# Patient Record
Sex: FEMALE | Race: BLACK OR AFRICAN AMERICAN | NOT HISPANIC OR LATINO | Employment: OTHER | URBAN - METROPOLITAN AREA
[De-identification: names, ages, dates, MRNs, and addresses within clinical notes are randomized per-mention and may not be internally consistent; named-entity substitution may affect disease eponyms.]

---

## 2021-06-23 ENCOUNTER — TRANSFERRED RECORDS (OUTPATIENT)
Dept: HEALTH INFORMATION MANAGEMENT | Facility: CLINIC | Age: 46
End: 2021-06-23

## 2021-07-09 ENCOUNTER — TRANSFERRED RECORDS (OUTPATIENT)
Dept: HEALTH INFORMATION MANAGEMENT | Facility: CLINIC | Age: 46
End: 2021-07-09

## 2021-07-10 ENCOUNTER — TELEPHONE (OUTPATIENT)
Dept: BEHAVIORAL HEALTH | Facility: CLINIC | Age: 46
End: 2021-07-10

## 2021-07-10 ENCOUNTER — TRANSFERRED RECORDS (OUTPATIENT)
Dept: HEALTH INFORMATION MANAGEMENT | Facility: CLINIC | Age: 46
End: 2021-07-10

## 2021-07-10 ENCOUNTER — HOSPITAL ENCOUNTER (INPATIENT)
Facility: HOSPITAL | Age: 46
LOS: 23 days | Discharge: HOME | DRG: 885 | End: 2021-08-03
Attending: PSYCHIATRY & NEUROLOGY | Admitting: PSYCHIATRY & NEUROLOGY
Payer: MEDICARE

## 2021-07-10 DIAGNOSIS — F20.0 PARANOID SCHIZOPHRENIA (H): Primary | ICD-10-CM

## 2021-07-10 LAB
ALT SERPL-CCNC: 19 U/L (ref 18–65)
AST SERPL-CCNC: 25 U/L (ref 10–30)
CREATININE (EXTERNAL): 0.78 MG/DL (ref 0.7–1.2)
GFR ESTIMATED (EXTERNAL): >60 ML/MIN/1.73M2
GLUCOSE (EXTERNAL): 142 MG/DL (ref 60–99)
POTASSIUM (EXTERNAL): 3.6 MMOL/L (ref 3.5–5.1)
TSH SERPL-ACNC: 4.15 MIU/ML (ref 0.35–4.8)
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
## 2021-07-11 PROBLEM — F29 PSYCHOSIS (H): Status: ACTIVE | Noted: 2021-07-11

## 2021-07-11 PROCEDURE — 124N000001 HC R&B MH

## 2021-07-11 PROCEDURE — 250N000013 HC RX MED GY IP 250 OP 250 PS 637: Performed by: NURSE PRACTITIONER

## 2021-07-11 PROCEDURE — 250N000011 HC RX IP 250 OP 636: Performed by: NURSE PRACTITIONER

## 2021-07-11 PROCEDURE — 99223 1ST HOSP IP/OBS HIGH 75: CPT | Mod: AI | Performed by: NURSE PRACTITIONER

## 2021-07-11 PROCEDURE — 250N000013 HC RX MED GY IP 250 OP 250 PS 637: Performed by: PSYCHIATRY & NEUROLOGY

## 2021-07-11 RX ORDER — BENZTROPINE MESYLATE 1 MG/1
1 TABLET ORAL 2 TIMES DAILY
Status: ON HOLD | COMMUNITY
End: 2021-08-02

## 2021-07-11 RX ORDER — VITAMIN B COMPLEX
50 TABLET ORAL DAILY
Status: DISCONTINUED | OUTPATIENT
Start: 2021-07-12 | End: 2021-08-03 | Stop reason: HOSPADM

## 2021-07-11 RX ORDER — BENZTROPINE MESYLATE 1 MG/1
1 TABLET ORAL 2 TIMES DAILY PRN
Status: DISCONTINUED | OUTPATIENT
Start: 2021-07-11 | End: 2021-08-03 | Stop reason: HOSPADM

## 2021-07-11 RX ORDER — OLANZAPINE 10 MG/2ML
5-10 INJECTION, POWDER, FOR SOLUTION INTRAMUSCULAR 3 TIMES DAILY PRN
Status: DISCONTINUED | OUTPATIENT
Start: 2021-07-11 | End: 2021-07-15

## 2021-07-11 RX ORDER — MULTIVITAMIN/IRON/FOLIC ACID 18MG-0.4MG
1 TABLET ORAL DAILY
COMMUNITY

## 2021-07-11 RX ORDER — MULTIPLE VITAMINS W/ MINERALS TAB 9MG-400MCG
1 TAB ORAL DAILY
Status: DISCONTINUED | OUTPATIENT
Start: 2021-07-12 | End: 2021-08-03 | Stop reason: HOSPADM

## 2021-07-11 RX ORDER — DIPHENHYDRAMINE HCL 25 MG
25-50 CAPSULE ORAL EVERY 6 HOURS PRN
Status: DISCONTINUED | OUTPATIENT
Start: 2021-07-11 | End: 2021-08-02

## 2021-07-11 RX ORDER — LORAZEPAM 1 MG/1
1-2 TABLET ORAL EVERY 6 HOURS PRN
Status: DISCONTINUED | OUTPATIENT
Start: 2021-07-11 | End: 2021-08-02

## 2021-07-11 RX ORDER — LANOLIN ALCOHOL/MO/W.PET/CERES
3 CREAM (GRAM) TOPICAL
Status: DISCONTINUED | OUTPATIENT
Start: 2021-07-11 | End: 2021-08-03 | Stop reason: HOSPADM

## 2021-07-11 RX ORDER — LORAZEPAM 2 MG/ML
1-2 INJECTION INTRAMUSCULAR EVERY 6 HOURS PRN
Status: DISCONTINUED | OUTPATIENT
Start: 2021-07-11 | End: 2021-08-02

## 2021-07-11 RX ORDER — ACETAMINOPHEN 325 MG/1
650 TABLET ORAL EVERY 4 HOURS PRN
Status: DISCONTINUED | OUTPATIENT
Start: 2021-07-11 | End: 2021-08-03 | Stop reason: HOSPADM

## 2021-07-11 RX ORDER — FLUPHENAZINE DECANOATE 25 MG/ML
12.5 INJECTION, SOLUTION INTRAMUSCULAR; SUBCUTANEOUS
Status: ON HOLD | COMMUNITY
End: 2021-07-13

## 2021-07-11 RX ORDER — MULTIPLE VITAMINS W/ MINERALS TAB 9MG-400MCG
1 TAB ORAL DAILY
Status: DISCONTINUED | OUTPATIENT
Start: 2021-07-12 | End: 2021-07-11

## 2021-07-11 RX ORDER — LEVOTHYROXINE SODIUM 50 UG/1
50 TABLET ORAL DAILY
Status: ON HOLD | COMMUNITY
End: 2021-08-02

## 2021-07-11 RX ORDER — MAGNESIUM HYDROXIDE/ALUMINUM HYDROXICE/SIMETHICONE 120; 1200; 1200 MG/30ML; MG/30ML; MG/30ML
30 SUSPENSION ORAL EVERY 4 HOURS PRN
Status: DISCONTINUED | OUTPATIENT
Start: 2021-07-11 | End: 2021-08-02

## 2021-07-11 RX ORDER — HALOPERIDOL 5 MG/1
5-10 TABLET ORAL EVERY 6 HOURS PRN
Status: DISCONTINUED | OUTPATIENT
Start: 2021-07-11 | End: 2021-07-16

## 2021-07-11 RX ORDER — LITHIUM CARBONATE 600 MG/1
600 CAPSULE ORAL AT BEDTIME
Status: ON HOLD | COMMUNITY
End: 2021-07-13

## 2021-07-11 RX ORDER — LEVOTHYROXINE SODIUM 50 UG/1
50 TABLET ORAL
Status: DISCONTINUED | OUTPATIENT
Start: 2021-07-12 | End: 2021-08-03 | Stop reason: HOSPADM

## 2021-07-11 RX ORDER — DIVALPROEX SODIUM 500 MG/1
500 TABLET, EXTENDED RELEASE ORAL AT BEDTIME
Status: DISCONTINUED | OUTPATIENT
Start: 2021-07-11 | End: 2021-07-12

## 2021-07-11 RX ORDER — MULTIVIT-MIN/IRON/FOLIC ACID/K 18-600-40
2 CAPSULE ORAL DAILY
Status: ON HOLD | COMMUNITY
End: 2021-08-02

## 2021-07-11 RX ORDER — DIVALPROEX SODIUM 250 MG/1
250 TABLET, EXTENDED RELEASE ORAL DAILY
Status: ON HOLD | COMMUNITY
End: 2021-08-02

## 2021-07-11 RX ORDER — DIVALPROEX SODIUM 500 MG/1
1500 TABLET, EXTENDED RELEASE ORAL AT BEDTIME
Status: ON HOLD | COMMUNITY
End: 2021-08-02

## 2021-07-11 RX ORDER — AMOXICILLIN 250 MG
1 CAPSULE ORAL 2 TIMES DAILY PRN
Status: DISCONTINUED | OUTPATIENT
Start: 2021-07-11 | End: 2021-08-03 | Stop reason: HOSPADM

## 2021-07-11 RX ORDER — HALOPERIDOL 5 MG/ML
5-10 INJECTION INTRAMUSCULAR EVERY 6 HOURS PRN
Status: DISCONTINUED | OUTPATIENT
Start: 2021-07-11 | End: 2021-07-16

## 2021-07-11 RX ORDER — CLONIDINE HYDROCHLORIDE 0.1 MG/1
0.1 TABLET ORAL 2 TIMES DAILY PRN
Status: DISCONTINUED | OUTPATIENT
Start: 2021-07-11 | End: 2021-08-02

## 2021-07-11 RX ORDER — LITHIUM CARBONATE 300 MG/1
300 TABLET, FILM COATED, EXTENDED RELEASE ORAL AT BEDTIME
Status: DISCONTINUED | OUTPATIENT
Start: 2021-07-11 | End: 2021-07-12

## 2021-07-11 RX ORDER — OLANZAPINE 5 MG/1
5-10 TABLET ORAL 3 TIMES DAILY PRN
Status: DISCONTINUED | OUTPATIENT
Start: 2021-07-11 | End: 2021-07-15

## 2021-07-11 RX ORDER — HYDROXYZINE HYDROCHLORIDE 25 MG/1
25-50 TABLET, FILM COATED ORAL EVERY 4 HOURS PRN
Status: DISCONTINUED | OUTPATIENT
Start: 2021-07-11 | End: 2021-08-02

## 2021-07-11 RX ORDER — DIPHENHYDRAMINE HYDROCHLORIDE 50 MG/ML
25-50 INJECTION INTRAMUSCULAR; INTRAVENOUS EVERY 6 HOURS PRN
Status: DISCONTINUED | OUTPATIENT
Start: 2021-07-11 | End: 2021-08-02

## 2021-07-11 RX ADMIN — DIVALPROEX SODIUM 500 MG: 500 TABLET, FILM COATED, EXTENDED RELEASE ORAL at 20:21

## 2021-07-11 RX ADMIN — OLANZAPINE 10 MG: 10 INJECTION, POWDER, FOR SOLUTION INTRAMUSCULAR at 09:18

## 2021-07-11 RX ADMIN — CLONIDINE HYDROCHLORIDE 0.1 MG: 0.1 TABLET ORAL at 17:46

## 2021-07-11 RX ADMIN — LORAZEPAM 2 MG: 1 TABLET ORAL at 19:02

## 2021-07-11 RX ADMIN — HALOPERIDOL 10 MG: 5 TABLET ORAL at 19:02

## 2021-07-11 RX ADMIN — LITHIUM CARBONATE 300 MG: 300 TABLET, EXTENDED RELEASE ORAL at 20:21

## 2021-07-11 RX ADMIN — LORAZEPAM 2 MG: 1 TABLET ORAL at 10:58

## 2021-07-11 RX ADMIN — ACETAMINOPHEN 650 MG: 325 TABLET, FILM COATED ORAL at 09:59

## 2021-07-11 RX ADMIN — HALOPERIDOL 10 MG: 5 TABLET ORAL at 10:58

## 2021-07-11 RX ADMIN — MELATONIN 3 MG: 3 TAB ORAL at 23:59

## 2021-07-11 RX ADMIN — DIPHENHYDRAMINE HYDROCHLORIDE 50 MG: 25 CAPSULE ORAL at 10:59

## 2021-07-11 RX ADMIN — DIPHENHYDRAMINE HYDROCHLORIDE 50 MG: 25 CAPSULE ORAL at 19:02

## 2021-07-11 ASSESSMENT — ACTIVITIES OF DAILY LIVING (ADL)
ORAL_HYGIENE: PROMPTS
DRESS: PROMPTS
HYGIENE/GROOMING: PROMPTS

## 2021-07-11 NOTE — PLAN OF CARE
Problem: Behavioral Health Plan of Care  Goal: Patient-Specific Goal (Individualization)  Description: Pt will sleep 6-8 hours per night.   Pt will eat 50% of meals.   Pt will attend 50% of groups when on open unit.  Pt will comply with nursing assessment as needed.   Pt will accept PRN medications when offered.    7/11 - Pt unable to wean from MHICU at this time, to be reassessed daily.  7/11/2021 1258 by Arabella Lauren RN  Outcome: No Change    Pt on open unit at start of shift, 1:1 staff present for safety while wearing extensive hair decor that pt refused to remove. Pt made multiple requests for items that are restricted on unit, wanted jacket and sweat pants due to being cold, given extra sweatshirt. Pt ate breakfast in lounge with peers, went to  with staff present and was noted to have dipped her long extension of decorated hair into the toilet, taking off one of the wet hair scrunchies to hand to the 1:1 staff. Pt's hair was dripping onto her clothing as she continued to lounge to share more of the scrunchies with her peers. When redirected to her room, pt became agitated and screamed at staff, repeatedly cursing and posturing within inches of staff's face. Pt threatened to kill staff and was unable to be calmed. Control team called at 0844 to pt's room. Nursing staff was instructed to remove hair decor as pt needed to be placed in MHICU. Manual hold of limbs was done as writer removed over 50 scrunchies and metal hairpins from hair. Pt calmer, but continued to intermittently pray and curse at staff. Hair was wanded to assure all contraband was removed. Pt was given IM Zyprexa 10mg and agreed to walk on her own to MHICU at 0913. Requested Tylenol and received at 0959, although would not report further about her pain. Pt is disorganized at all times, suspicious and refusing to cooperate or talk to particular staff. Pt paranoid that her food was touched by evil hands and refused to eat lunch. Pt appears to  be having AH and frequently is noted talking over her right shoulder. Oral Haldol, Benadryl and Ativan given at 1058 for continued agitation and paranoia. Pt remained awake throughout the rest of shift, has odd content in conversation and makes frequent requests. Continued monitoring for safety and further needs.  Pt's mother called earlier today, was notified by pt's boyfriend that she was inpt. Pt refused to sign CORTNEY for,  or to speak with her mother. Mother made aware and did share information one-way with staff RN to discuss recent contacts she has had with pt, but unable to have conversation. Mother (Jeanna Hines) is in [place] and left phone # [phone].    Problem: Thought Process Alteration  Goal: Optimal Thought Clarity  Description: Pt will have a linear reality based conversation by discharge.   Outcome: No Change    1530 - Face to face end of shift report to be communicated to oncoming shift RN.     Arabella Lauren RN  7/11/2021  2:24 PM

## 2021-07-11 NOTE — PLAN OF CARE
"  Problem: Behavioral Health Plan of Care  Goal: Patient-Specific Goal (Individualization)  Description: Pt will sleep 6-8 hours per night.   Pt will eat 50% of meals.   Pt will attend 50% of groups when on open unit.  Pt will comply with nursing assessment as needed.   Pt will accept PRN medications when offered.    7/11 - Pt unable to wean from MHICU at this time, to be reassessed daily.  Outcome: Improving  Note:   Pt has been in the MHICU this shift. Pt knocked on the nursing station window every few minutes through out the shift. Pt asked for new sweatshirts stating she needs a blue one, pt has water on her sweatshirt when she comes to the nursing stating and nursing asked her not to continue splashing water on her sweatshirt. Pt stripped the sheets and pillow cases off of her bed then asked for new ones. Pt states that she needed her bathroom and floor mopped because she had to go to the bathroom and could not make it to the toilet. Room was mopped and bedding was replaced. Pt refused to eat her dinner after asking if nursing could let her leave and being reminded that she is on a hold. Pt later agreed to eat a pizza if ordered. Pt made several delusional statements during shift but statements were random such as walking up to floor staff, touching her hand stating \"you are now cured of HIV\". Pt vitals taken at dinner time, /88, pulse 106. Pt given clonidine 0.1mg at 1746.     1902-pt unable to sit in one position for more than a few minutes, continues to pound on window to nursing station requesting various items such as water, coffee, new scrubs, bedding etc. Pt required frequent redirection to not take off new bedding. Pt came to the nursing station coughing loudly and stating she needs tylenol because of her cold.     Pt given benadryl 50mg, ativan 2mg and haldol 10mg at 1902. Pt cooperative with prn and scheduled medicaions. Pt able to relax and lay on her bed around 2100 and appears to be sleeping. "      Problem: Thought Process Alteration  Goal: Optimal Thought Clarity  Description: Pt will have a linear reality based conversation by discharge.   Outcome: Improving     Face to face end of shift report communicated to night shift RN.     Nenita Harding RN  7/11/2021  4:33 PM

## 2021-07-11 NOTE — PLAN OF CARE
"Violent/Self-Destructive Seclusion or Restraint Initiation Note    Patient behaviors justifying violent/self-destructive seclusion or restraint (describe attempted least restricted alternatives and patient's response to interventions): Patient yelling and screaming at staff. Yelled out \"I'm going to kill you, you bitch!\" She also was dunking her hair in the toilet. Patient had multiple hair ties and yamilet pins in place. After dunking her hair in the toile, she said she was going to give them out to the patients.   Type of restraint initiated: manual hold    Date Started: 7/11/2021  Time Started: 08:57  LIP notified (name): Shakira HOLM NP  Order obtained: Yes.   Patient educated on reason for seclusion or restraints and discontinuation criteria: Yes  Care plan updated: Yes.     "

## 2021-07-11 NOTE — PRE-PROCEDURE
Seclusion/Restraint Face to Face Note  In person face to face assessment completed, including an evaluation of the patient's immediate reaction to the intervention, complete review of systems assessment, behavioral assessment and review/assessment of history, drugs and medications, recent labs, etc., and behavioral condition.  The patient experienced: No adverse physical outcome from seclusion/restraint initiation.  The intervention of restraint or seclusion needs to terminate.  If face to face was completed by a trained RN, the above findings were discused with Shakira Kilpatrick NP.   Luiza May RN  7/11/2021  10:41 AM

## 2021-07-11 NOTE — PLAN OF CARE
Violent/Self-Destructive Seclusion or Restraint Discontinuation Note    Type of seclusion or restraint: manual hold  Patient's response to intervention: Patient is calm and coooerateing  Date of discontinuation: 7/11/2021  Time of discontinuation: 09:12         Face to face assessment completed: yes  Restraint monitoring minimum of every 15 minutes: yes  Debriefing with patient completed: yes  Care plan updated:yes
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
## 2021-07-11 NOTE — PLAN OF CARE
"  Problem: Behavioral Health Plan of Care  Goal: Patient-Specific Goal (Individualization)  Description: Pt will sleep 6-8 hours per night.   Pt will eat 50% of meals.   Pt will attend 50% of groups.  Outcome: Improving  Note:   Pt admitted to unit at 1154, pt was seen to have long multiple scarves and hair ties around her head and hanging down past her waist. Pt informed that this would be against our policy since her scarves and ties can be used as ligatures. Pt refused to take off the scarves and ties stating no matter if you stab me, kill me, tie me up, I will not take my hair off. Pt told that the material needed to come off not her hair, pt states \"this is my hair\". Pt stated that this is cultural and we have no right to make her take them out. Supervisor notified. Pt put on one to one for safety.     Pt slept for a couple of hours then came out to the lounge and colored. Pt told nursing she is having ticks and turns her head to the left repeatedly.          Problem: Thought Process Alteration  Goal: Optimal Thought Clarity  Description: Pt will have a linear reality based conversation by discharge.   Outcome: Improving         ADMISSION NOTE    Reason for admission [place].  Safety concerns Pt wearing multiple scarves and hair ties in her hair, refusing to take them off, .  Risk for or history of violence no.   Full skin assessment: skin assessment complete, no issues.     Patient arrived on unit from [facility] accompanied by Security on 7/11/2021  11:54 AM.   Status on arrival: Pt ambulatory and cooperative until asked to take out bandanas, hair ties and scarves,.   There were no vitals taken for this visit.  Patient given tour of [facility] to  unit papers given to patient, wanding completed, belongings inventoried, and admission assessment completed.   Patient's legal status on arrival is 72 hour hold. Appropriate legal rights discussed with and copy given to patient. Patient Bill of Rights " discussed with and copy given to patient.   Patient denies SI, HI, and thoughts of self harm and contracts for safety while on unit.      Nenita Harding RN  7/11/2021  11:54 AM

## 2021-07-11 NOTE — PROGRESS NOTES
07/11/21 0045   Patient Belongings   Did you bring any home meds/supplements to the hospital?  Yes   Disposition of meds  Other (see comment)  (Given to PT RN)   Patient Belongings remains with patient;locker   Patient Belongings Remaining with Patient other (see comments)  (Traditional Head dress)   Patient Belongings Put in Hospital Secure Location (Security or Locker, etc.) other (see comments)  (Back pack, bagsx2, assorted documents, pursex2, makeup, pens, bible, masks, hat, pants, shoes, black jacket, tshirt, Bra, $4.14 in change)   Belongings Search Yes   Clothing Search Yes   Second Staff Luiza RN   List items sent to safe: [place] x2, DTA pass x3, [facility] card, $19 (9x $1 bills, 2x $5 bills)  Addendum:pink head band, String  necklace  All other belongings put in assigned cubby in belongings room.   I have reviewed my belongings list on admission and verify that it is correct.     ADDENDUM: silver colored ring    Patient signature_______________________________    Second staff witness (if patient unable to sign) ______________________________       I have received all my belongings at discharge.    Patient signature________________________________    LORIE Browne  7/11/2021  12:48 AM

## 2021-07-12 PROCEDURE — 124N000001 HC R&B MH

## 2021-07-12 PROCEDURE — 99221 1ST HOSP IP/OBS SF/LOW 40: CPT | Performed by: NURSE PRACTITIONER

## 2021-07-12 PROCEDURE — 250N000013 HC RX MED GY IP 250 OP 250 PS 637: Performed by: NURSE PRACTITIONER

## 2021-07-12 PROCEDURE — 99233 SBSQ HOSP IP/OBS HIGH 50: CPT | Performed by: NURSE PRACTITIONER

## 2021-07-12 RX ORDER — FLUPHENAZINE HYDROCHLORIDE 1 MG/1
0.5 TABLET, FILM COATED ORAL 2 TIMES DAILY
Status: DISCONTINUED | OUTPATIENT
Start: 2021-07-12 | End: 2021-07-14

## 2021-07-12 RX ORDER — LITHIUM CARBONATE 450 MG
450 TABLET, EXTENDED RELEASE ORAL AT BEDTIME
Status: DISCONTINUED | OUTPATIENT
Start: 2021-07-12 | End: 2021-07-16

## 2021-07-12 RX ADMIN — HALOPERIDOL 10 MG: 5 TABLET ORAL at 01:46

## 2021-07-12 RX ADMIN — LORAZEPAM 2 MG: 1 TABLET ORAL at 01:46

## 2021-07-12 RX ADMIN — DIPHENHYDRAMINE HYDROCHLORIDE 50 MG: 25 CAPSULE ORAL at 16:06

## 2021-07-12 RX ADMIN — DIVALPROEX SODIUM 750 MG: 500 TABLET, FILM COATED, EXTENDED RELEASE ORAL at 20:03

## 2021-07-12 RX ADMIN — MULTIPLE VITAMINS W/ MINERALS TAB 1 TABLET: TAB at 16:06

## 2021-07-12 RX ADMIN — LEVOTHYROXINE SODIUM 50 MCG: 0.05 TABLET ORAL at 06:43

## 2021-07-12 RX ADMIN — HALOPERIDOL 10 MG: 5 TABLET ORAL at 08:32

## 2021-07-12 RX ADMIN — DIPHENHYDRAMINE HYDROCHLORIDE 50 MG: 25 CAPSULE ORAL at 01:46

## 2021-07-12 RX ADMIN — LORAZEPAM 2 MG: 1 TABLET ORAL at 16:06

## 2021-07-12 RX ADMIN — LITHIUM CARBONATE 450 MG: 450 TABLET ORAL at 20:03

## 2021-07-12 RX ADMIN — OLANZAPINE 10 MG: 5 TABLET, FILM COATED ORAL at 20:04

## 2021-07-12 RX ADMIN — Medication 50 MCG: at 08:32

## 2021-07-12 RX ADMIN — DIPHENHYDRAMINE HYDROCHLORIDE 50 MG: 25 CAPSULE ORAL at 08:32

## 2021-07-12 RX ADMIN — FLUPHENAZINE HYDROCHLORIDE 0.5 MG: 1 TABLET, FILM COATED ORAL at 20:16

## 2021-07-12 RX ADMIN — LORAZEPAM 2 MG: 1 TABLET ORAL at 08:32

## 2021-07-12 RX ADMIN — HALOPERIDOL 10 MG: 5 TABLET ORAL at 16:06

## 2021-07-12 ASSESSMENT — ACTIVITIES OF DAILY LIVING (ADL)
DRESS: PROMPTS
DRESS: PROMPTS
HYGIENE/GROOMING: PROMPTS
ORAL_HYGIENE: PROMPTS
HYGIENE/GROOMING: PROMPTS
LAUNDRY: UNABLE TO COMPLETE
ORAL_HYGIENE: PROMPTS

## 2021-07-12 NOTE — PLAN OF CARE
Problem: Behavioral Health Plan of Care  Goal: Patient-Specific Goal (Individualization)  Description: Pt will sleep 6-8 hours per night.   Pt will eat 50% of meals.   Pt will attend 50% of groups when on open unit.  Pt will comply with nursing assessment as needed.   Pt will accept PRN medications when offered.    7/11 - Pt unable to wean from MHICU at this time, to be reassessed daily.  Outcome: No Change  Note:        Problem: Thought Process Alteration  Goal: Optimal Thought Clarity  Description: Pt will have a linear reality based conversation by discharge.   Outcome: No Change     Problem: Suicidal Behavior  Goal: Suicidal Behavior is Absent or Managed  Outcome: No Change   Pt. Slept 2.5 hours last night, eating at least 50% of meals, unable to participate in assessments, pt. Is irritable and thought process is not intact, is accepting prn medications when needed, unable to wean at this time due to unpredictable and irritable behaviors, knocking on door to nurses station every 5 minutes, spilling liquids on scrubs frequently, needing several new scrubs, encouraged pt. To try not to spill her liquids.  1606-  Pt. Irritable and angry, medicated with haldol 10 mg po, ativan 2 mg po and benadryl 50 mg po for increased agitation.  2004-  Pt. Talking loudly, knocking on doors frequently, increased agitation noted, medicated with zyprexa 10 mg po for increased agitation.    Face to face end of shift report will be communicated to oncoming night shift RN.     Eri Clifford RN  7/12/2021  9:26 PM

## 2021-07-12 NOTE — PLAN OF CARE
Problem: Thought Process Alteration  Goal: Optimal Thought Clarity  Description: Pt will have a linear reality based conversation by discharge.   Outcome: No Change   Patient is unable to have a linear reality based conversation with staff.  She made multiple listed of shoes and Faro[place] letters and asked staff to look them up on the internet.        Problem: Behavioral Health Plan of Care  Goal: Patient-Specific Goal (Individualization)  Description: Pt will sleep 6-8 hours per night.   Pt will eat 50% of meals.   Pt will attend 50% of groups when on open unit.  Pt will comply with nursing assessment as needed.   Pt will accept PRN medications when offered.    7/11 - Pt unable to wean from MHICU at this time, to be reassessed daily.  Outcome: No Change  Note:      Patient has been up most of the shift.  She comes to the nurses station window and knocks every couple minutes.  She requested multiple snacks and coffee.  Gave patient 3 mg Melatonin at 2339 with no effect. She is redirectable and calm when talking with staff.  She was restless and pacing in the lounge and agreed to take 10 mg Haldol, 2 mg Ativan, and 50 mg Benadryl at 0146.  She was in bed asleep at 0245 but awake again at 0430. Patient was incontinent of urine while asleep.  She was able to attend to her hygiene needs independently.  No complaints of pain.  Face to face end of shift report communicated to day shift RN.     Brook Montero RN  7/12/2021  6:07 AM
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
## 2021-07-12 NOTE — PLAN OF CARE
"Pt's significant other \"Juan\" called the  unit asking to speak with pt. He stated that today is there 22 month anniversary and they celebrate their anniversay every month. He stated he was pt's . Took a message at this time.   "
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
## 2021-07-12 NOTE — PLAN OF CARE
"    SW and Provider met with patient. Patient stated she does not have a . When provider asked patient , \"Who is Juan?\"  Patient answered, \" Juan is an ex roommate.\" Provider asked, \"Why would Juan be stating he is your ? \" Patient said, \" That's weird, I do not know why he would say that.\" Patient shared that she has a boy friend Edison also on the unit and she moved to [facility] with him. She also stated she would discharge home to Edison's family when she can leave. Provider explained to the patient that Edison on the unit stated he does not know her. Patient said, \" Do not insult my intelligence. \"    SW asked, \" Do you have a ?\" Patient answered, \" My old  through [facility] was Eh Shepard. \"     SW asked patient, \" Do you have a Medication provider? \" Patient answered, \" I was seeing Dr. Weiner from [facility].\"     SW asked the patient, \" Do you have an Individual Therapist?\" The patient said, \" I was seeing a Cecilio Mcdonnell at [facility].\"  "

## 2021-07-12 NOTE — PLAN OF CARE
"SHIFT SUMMARY:      Denies suicidal or homicidal thoughts, depression and anxiety or pain. Mood has been calm and cooperative. Perseverating on cleanliness of room and self. Rather than explaining that there was pudding on the floor, she described it as \"spirits covering the floor which need to be mopped to cleanse the floor.\" Focused on room cleanliness: \"I need a new roll of toilet paper, because this one is contaminated.\" and \"the sink needs to be sanitized\"    Her father called and she refused to call him back;  Also refused a release of information for him, her mom and brother.     Drawing \"tattoos\" on herself with markers. Has changed her sweatshirt from green to blue and requests another green sweatshirt \"because green is life and it breathes life into her tattoos\" also states she \"won't not shower as long as I am here because it will wash-off the tattoos\".      PRNs:  At 0832, benadryl 50 mg PO, haldol 10 mg PO and ativan 2 mg PO given for agitation;  Effective. Calmly sitting on bed, eating.      End of shift report given to oncoming nurse.            Problem: Behavioral Health Plan of Care  Goal: Patient-Specific Goal (Individualization)  Description: Pt will sleep 6-8 hours per night.   Pt will eat 50% of meals.   Pt will attend 50% of groups when on open unit.  Pt will comply with nursing assessment as needed.   Pt will accept PRN medications when offered.    7/11 - Pt unable to wean from MHICU at this time, to be reassessed daily.  Outcome: Improving  Goal: Absence of New-Onset Illness or Injury  Outcome: Improving     Problem: Thought Process Alteration  Goal: Optimal Thought Clarity  Description: Pt will have a linear reality based conversation by discharge.   Outcome: Improving     Problem: Suicidal Behavior  Goal: Suicidal Behavior is Absent or Managed  Outcome: Improving     "
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
## 2021-07-13 PROCEDURE — 99233 SBSQ HOSP IP/OBS HIGH 50: CPT | Performed by: NURSE PRACTITIONER

## 2021-07-13 PROCEDURE — 250N000013 HC RX MED GY IP 250 OP 250 PS 637: Performed by: NURSE PRACTITIONER

## 2021-07-13 PROCEDURE — 250N000011 HC RX IP 250 OP 636: Performed by: NURSE PRACTITIONER

## 2021-07-13 PROCEDURE — 124N000001 HC R&B MH

## 2021-07-13 RX ORDER — LITHIUM CARBONATE 300 MG/1
600 TABLET, FILM COATED, EXTENDED RELEASE ORAL AT BEDTIME
Status: ON HOLD | COMMUNITY
End: 2021-08-02

## 2021-07-13 RX ORDER — FLUPHENAZINE HYDROCHLORIDE 1 MG/1
0.5 TABLET ORAL 2 TIMES DAILY
Status: ON HOLD | COMMUNITY
End: 2021-08-02

## 2021-07-13 RX ORDER — DIVALPROEX SODIUM 500 MG/1
1000 TABLET, EXTENDED RELEASE ORAL AT BEDTIME
Status: DISCONTINUED | OUTPATIENT
Start: 2021-07-13 | End: 2021-07-14

## 2021-07-13 RX ADMIN — ALUMINUM HYDROXIDE, MAGNESIUM HYDROXIDE, AND SIMETHICONE 30 ML: 200; 200; 20 SUSPENSION ORAL at 18:00

## 2021-07-13 RX ADMIN — DIPHENHYDRAMINE HYDROCHLORIDE 50 MG: 50 INJECTION INTRAMUSCULAR; INTRAVENOUS at 00:13

## 2021-07-13 RX ADMIN — FLUPHENAZINE HYDROCHLORIDE 0.5 MG: 1 TABLET, FILM COATED ORAL at 08:40

## 2021-07-13 RX ADMIN — HALOPERIDOL LACTATE 10 MG: 5 INJECTION, SOLUTION INTRAMUSCULAR at 00:13

## 2021-07-13 RX ADMIN — LORAZEPAM 2 MG: 1 TABLET ORAL at 18:26

## 2021-07-13 RX ADMIN — DIPHENHYDRAMINE HYDROCHLORIDE 50 MG: 25 CAPSULE ORAL at 18:26

## 2021-07-13 RX ADMIN — HALOPERIDOL 10 MG: 5 TABLET ORAL at 18:26

## 2021-07-13 RX ADMIN — DIVALPROEX SODIUM 1000 MG: 500 TABLET, FILM COATED, EXTENDED RELEASE ORAL at 20:08

## 2021-07-13 RX ADMIN — HALOPERIDOL 10 MG: 5 TABLET ORAL at 06:54

## 2021-07-13 RX ADMIN — MULTIPLE VITAMINS W/ MINERALS TAB 1 TABLET: TAB at 16:38

## 2021-07-13 RX ADMIN — LEVOTHYROXINE SODIUM 50 MCG: 0.05 TABLET ORAL at 06:09

## 2021-07-13 RX ADMIN — HYDROXYZINE HYDROCHLORIDE 50 MG: 25 TABLET, FILM COATED ORAL at 08:40

## 2021-07-13 RX ADMIN — Medication 50 MCG: at 08:40

## 2021-07-13 RX ADMIN — FLUPHENAZINE HYDROCHLORIDE 0.5 MG: 1 TABLET, FILM COATED ORAL at 20:08

## 2021-07-13 RX ADMIN — LITHIUM CARBONATE 450 MG: 450 TABLET ORAL at 20:08

## 2021-07-13 RX ADMIN — OLANZAPINE 10 MG: 5 TABLET, FILM COATED ORAL at 16:57

## 2021-07-13 RX ADMIN — LORAZEPAM 2 MG: 1 TABLET ORAL at 06:54

## 2021-07-13 RX ADMIN — LORAZEPAM 2 MG: 2 INJECTION INTRAMUSCULAR; INTRAVENOUS at 00:13

## 2021-07-13 RX ADMIN — DIPHENHYDRAMINE HYDROCHLORIDE 50 MG: 25 CAPSULE ORAL at 06:54

## 2021-07-13 ASSESSMENT — ACTIVITIES OF DAILY LIVING (ADL)
ORAL_HYGIENE: PROMPTS
LAUNDRY: UNABLE TO COMPLETE
HYGIENE/GROOMING: INDEPENDENT
DRESS: PROMPTS

## 2021-07-13 NOTE — PLAN OF CARE
Face to face shift report received from Eri GREEN RN. Rounding completed, pt observed in MHICU awake and alert.  In no apparent distress. Respirations are even and non labored.        Problem: Behavioral Health Plan of Care  Goal: Patient-Specific Goal (Individualization)  Description: Pt will sleep 6-8 hours per night.   Pt will eat 50% of meals.   Pt will attend 50% of groups when on open unit.  Pt will comply with nursing assessment as needed.   Pt will accept PRN medications when offered.    7/11 - Pt unable to wean from MHICU at this time, to be reassessed daily.  Outcome: No Change      0000-Pt was banging on the door multiple times, she was noted to have taken a cup of water on dumped it on her bed and then told staff she was incontinent of urine.  She became increasingly agitated, yelling, screaming and talking non sensicle in nature.      Pt was given prn medication at approx. 0010 (Haldol, Benadryl, and Ativan IM per MD order.)     She did sleep approx. 2.5 hours during the night.     0630-Pt began to become increasingly anxious, pounding on the doors and windows of MHICU.  Wiping her buttox with her sweatshirt, plugged the toilet, yelling.  Pt received PRN medications Haldol, Benadryl, and Ativan.  See MAR.      Face to face report will be communicated to oncoming ANTONIO.    Danisha Torres RN  7/13/2021  7:03 AM    Face to face report will be communicated to oncoming RN.

## 2021-07-13 NOTE — PLAN OF CARE
MÓNICA faxed Petition for Committment to [facility].     MÓNICA verified with Daja GRACE with [facility]. She confirmed that she received the petition for commitment.

## 2021-07-13 NOTE — PLAN OF CARE
"    1:1 time with the patient.  No changes made to the discharge plan at this time.   See the AVS for follow up appointments and recommendations.     SW spoke to patient. Patient discussed her self drawn marker tattoos.     MÓNICA received an email from Daja with [facility] that stated:     \"Shoaib Paris is going to do the screening.    Thanks\"    Shoaib Paris called to screen patient.     Shoaib Paris called and spoke to SW and requested SW to try to have patient sign in voluntary if she refuses he will submit the pettition to the .     MÓNICA spoke to patient. Sw asked the patient, \" Are you willing to sign in voluntarily to the hospital?\" Patient said, \" I just want to go home, and talk to Father Michelet.\"     MÓNICA called and spoke to Shoaib Paris and updated him that the patient refused to sign in voluntary.     Shoaib Paris is submitting the Petition to the .   "

## 2021-07-13 NOTE — PLAN OF CARE
Problem: Behavioral Health Plan of Care  Goal: Patient-Specific Goal (Individualization)  Description: Pt will sleep 6-8 hours per night.   Pt will eat 50% of meals.   Pt will attend 50% of groups when on open unit.  Pt will comply with nursing assessment as needed.   Pt will accept PRN medications when offered.    7/11 - Pt able to wean from MHICU on a limited basis. This will be reassessed daily. 7/13/21: will try coming to the open unit for breakfast and group today.  Outcome: Improving  Note: Denies suicidal or homicidal ideation, anxiety or depression and pain.    Repeatedly knocking on the window in to the nurse's station and demanding changes of clothes.     Approached the nurse's station with her glasses and requested her reading glasses. Writer was unable to promptly locate them which agitated her. Offered a PRN 10 mg PO zyprexa at 1700, which she did take;  Minimally effective - singing loudly at nurse's station, dancing and attempting to touch others;  PRN benadryl 50 mg PO, haldol 10 mg, and ativan 2 mg PO given and it was effective - sleeping in her MHICU room.    When given HS meds, she took the offered water cup to her sink, poured out the water in the cup, scrubbed the cup with her fingers, then refilled with water from her sink. Took her HS meds, which included her depakote, without issue.    Weaned approximately two hours.    Report given to oncoming nurse.     Problem: Thought Process Alteration  Goal: Optimal Thought Clarity  Description: Pt will have a linear reality based conversation by discharge.   Outcome: Improving     Problem: Suicidal Behavior  Goal: Suicidal Behavior is Absent or Managed  Outcome: Improving

## 2021-07-13 NOTE — PLAN OF CARE
"Problem: Behavioral Health Plan of Care  Goal: Patient-Specific Goal (Individualization)  Description: Pt will sleep 6-8 hours per night.   Pt will eat 50% of meals.   Pt will attend 50% of groups when on open unit.  Pt will comply with nursing assessment as needed.   Pt will accept PRN medications when offered.  7/11 - Pt able to wean from MHICU on a limited basis. This will be reassessed daily. 7/13/21: will try coming to the open unit for breakfast and group today.  Outcome: Improving  She is up on the unit. She is maintaining appropriate boundaries with staff and peers. She can be intrusive but is redirectable. She is \"thankful\" about being able to go to groups today. She showered after breakfast. She was screened over the phone due to a petition for commitment being submitted to the [facility].   1500: she spent most of the day on the open unit. She attended some groups.     Problem: Thought Process Alteration  Goal: Optimal Thought Clarity  Description: Pt will have a linear reality based conversation by discharge.   Outcome: Improving  She is preoccupied. She is disorganized. She is noted to respond to auditory hallucinations. She makes fair eye contact. She stops mid conversation, looks away as if listening to someone else, then states \"ok\" and returns to the conversation with this nurse.      "

## 2021-07-13 NOTE — PLAN OF CARE
Prior to Admission Medication Reconciliation:     Medications added:   [x] None  [] As listed below:    Medications deleted:   [x] None  [] As listed below:    Medications marked for review/removal by attending:  [x] None  [] As listed below:    Changes made to existing medications:   [] None  [x] As listed below:    Lithium from IR to CR per pharmacy and prescribing facility report    Prolixin from 1/2 tab daily to BID per pharmacy and prescribing facility report    Last times/dates taken verified with patient:  [] Yes- completed myself  [] Prepared PTA medlist for review only. (will not be available to review personally)  [x] Did not review with patient. Rx verification only. Review completed by nursing.    [] Nurse completed no changes made (double checked entries)  [] Unable to review with patient at this time:  [] Nurse completed/changes made:     Allergies listed at another location:  []Not applicable   []See below:    Allergy review:    [x]Did not review: reviewed by nursing  []Did not review: pt unable at this time  []Patient/MAR verified NKDA  []Patient/MAR verified current existing allergies: no changes made    Medication reconciliation sources:   []Patient  []Patient family member/emergency contact: **  [x][facility] Report Review:    Home Medications     - Last Reconciled 06/23/21 by Michael Mendoza, Med Assist    benztropine 1 mg tablet 1 mg PO BID   cholecalciferol (vitamin D3) 2,000 unit capsule 2,000 units PO DAILY   divalproex 250 mg tablet,extended release 24 hr 250 mg PO   divalproex 500 mg tablet,extended release 24 hr 1,500 mg PO BEDTIME   fluphenazine decanoate 25 mg/mL injection solution 12.5 mg IM Q2W   levothyroxine 50 mcg tablet 50 mcg PO DAILY   lithium carbonate 300 mg capsule 600 mg PO BEDTIME   multivitamin-ferrous fumarate-folic acid 18 mg-400 mcg tablet (Cerovite Advanced Formula) 1 tab PO DAILY     []Epic Chart Review  []Care Everywhere review  []Pharmacy med list: **  [x]Pharmacy phone  call: all meds due to be mailed last Friday, consistently mailed out every 2 weeks, per pharmacy, fills reflect compliancy  []Outside meds dispense report  []Nursing home or Assisted Living MAR:  [x]Other: [facility]: last seen 6/25/21    Benztropine 1 mg BID    certavite daily    Divalproex  mg 1 tab daily     Divalproex  mg 3 tabs at bedtime     Levothyroxine 50 mcg daily     Lithium carbonate  mg - 600 mg at bedtime     Vitamin D 3 50 mcg 4000 units daily     Fluphenazine 1 mg 1/2 tablet BID   [facility] [place]     [facility] desired at discharge[facility]    Is patient on coumadin?  [x]No    Requests for consultation by provider or pharmacist:   [] Patient understands why all of their meds were prescribed and how to take them. No questions.   [] Managing party has no questions.   [] Patient/ managing party has questions about the following:  [x] Did not review with patient. Cannot assess.     Fill dates and reported compliancy:  [x] Fill dates coincide with compliancy for all/most maintenance meds.   [] Fill dates do not coincide with compliancy with maintenance meds. See notes in PTA medlist and in comments.    [] Fill dates do not coincide with the following medications but pt reports compliancy:  [] Did not review with patient. Cannot assess.     Historian accuracy:  [] Excellent- alert and oriented, understands why meds were prescribed and how to take, able to answer specifics  [] Good- alert and oriented, understands why meds were prescribed and how to take, some confusion   [] Fair- alert and oriented, doesn't know medications without list, cannot answer specifics about medications, but has a decent process for which to take at home  [] Poor- does not know medications, may not have a process to take at home, may be cognitively unable to review at this time  []Medication management done by family member or facility, no concerns about historian accuracy.   [x] Did not review  with patient. Cannot assess.     Medication Management:  [] Manages meds independently  [] Family member/ other party manages meds:  [] Meds managed by staff at facility  [] Meds set up by home care, family/other party helps administer  [] Meds set up by home care, self administers  [x] Did not review with patient. Cannot assess.     Other medications aside from PTA:  [] Denies taking any medications aside from those listed in PTA meds  [] Reports taking another medication(s) but cannot recall the name(s)  [] Refuses to say.  [x] Did not review with patient. Cannot assess.     Comments: fill dates reflect compliancy at pharmacy. Pt gets mail out every two weeks. Meds due to be mailed out Friday but they were unable to reach patient. No concerns. Will not be speaking to patient unless otherwise requested.     Lolita Villalobos on 7/13/2021 at 12:13 PM       Discrepancies: [x] No []Not Applicable []Yes: listed below    Time spent on medication reconciliation:   []5-20 mins  [x]20-40 mins  []> 40 mins    Issues completing PTA medication reconciliation:  [] On hold for a long time  [] Waited for a call back  [] Fax didn't come through  [] Fax took a long time  [] Other:    Notifying appropriate party of changes/additions/discrepancies:  [x]No pertinent changes made, notification not necessary.   [] Notified attending provider via text page/phone call  [] Notified attending provider in person  [] Notified pharmacy  [] Notified nurse  [] Attending provider not available, left detailed notes  [] Pt is not admitted to floor yet, PTA meds completed before admission.   Medications Prior to Admission   Medication Sig Dispense Refill Last Dose     lithium ER (LITHOBID) 300 MG CR tablet Take 600 mg by mouth At Bedtime        benztropine (COGENTIN) 1 MG tablet Take 1 mg by mouth 2 times daily   Unknown at Unknown time     divalproex sodium extended-release (DEPAKOTE ER) 250 MG 24 hr tablet Take 250 mg by mouth daily   Unknown at  Unknown time     divalproex sodium extended-release (DEPAKOTE ER) 500 MG 24 hr tablet Take 1,500 mg by mouth At Bedtime   Unknown at Unknown time     fluPHENAZine (PROLIXIN) 1 MG tablet Take 0.5 mg by mouth 2 times daily    Unknown at Unknown time     levothyroxine (SYNTHROID/LEVOTHROID) 50 MCG tablet Take 50 mcg by mouth daily   Unknown at Unknown time     Multiple Vitamins-Minerals (CENTROVITE) TABS Take 1 tablet by mouth daily   Unknown at Unknown time     Vitamin D (Cholecalciferol) 50 MCG (2000 UT) CAPS Take 2 tablets by mouth daily    Unknown at Unknown time

## 2021-07-14 PROCEDURE — 124N000001 HC R&B MH

## 2021-07-14 PROCEDURE — 99233 SBSQ HOSP IP/OBS HIGH 50: CPT | Performed by: NURSE PRACTITIONER

## 2021-07-14 PROCEDURE — 250N000013 HC RX MED GY IP 250 OP 250 PS 637: Performed by: PSYCHIATRY & NEUROLOGY

## 2021-07-14 PROCEDURE — 250N000013 HC RX MED GY IP 250 OP 250 PS 637: Performed by: NURSE PRACTITIONER

## 2021-07-14 RX ORDER — DIVALPROEX SODIUM 500 MG/1
1500 TABLET, EXTENDED RELEASE ORAL AT BEDTIME
Status: DISCONTINUED | OUTPATIENT
Start: 2021-07-14 | End: 2021-08-03 | Stop reason: HOSPADM

## 2021-07-14 RX ORDER — FLUPHENAZINE HYDROCHLORIDE 5 MG/1
5 TABLET ORAL 2 TIMES DAILY
Status: DISCONTINUED | OUTPATIENT
Start: 2021-07-14 | End: 2021-07-15

## 2021-07-14 RX ORDER — BENZTROPINE MESYLATE 0.5 MG/1
0.5 TABLET ORAL 2 TIMES DAILY
Status: DISCONTINUED | OUTPATIENT
Start: 2021-07-14 | End: 2021-07-26

## 2021-07-14 RX ADMIN — HALOPERIDOL 10 MG: 5 TABLET ORAL at 14:59

## 2021-07-14 RX ADMIN — OLANZAPINE 10 MG: 5 TABLET, FILM COATED ORAL at 16:07

## 2021-07-14 RX ADMIN — LORAZEPAM 2 MG: 1 TABLET ORAL at 14:59

## 2021-07-14 RX ADMIN — LORAZEPAM 2 MG: 1 TABLET ORAL at 21:26

## 2021-07-14 RX ADMIN — BENZTROPINE MESYLATE 1 MG: 1 TABLET ORAL at 16:15

## 2021-07-14 RX ADMIN — LEVOTHYROXINE SODIUM 50 MCG: 0.05 TABLET ORAL at 05:58

## 2021-07-14 RX ADMIN — BENZTROPINE MESYLATE 0.5 MG: 0.5 TABLET ORAL at 20:41

## 2021-07-14 RX ADMIN — DIVALPROEX SODIUM 1500 MG: 500 TABLET, FILM COATED, EXTENDED RELEASE ORAL at 20:41

## 2021-07-14 RX ADMIN — OLANZAPINE 10 MG: 5 TABLET, FILM COATED ORAL at 07:30

## 2021-07-14 RX ADMIN — DIPHENHYDRAMINE HYDROCHLORIDE 50 MG: 25 CAPSULE ORAL at 14:59

## 2021-07-14 RX ADMIN — DIPHENHYDRAMINE HYDROCHLORIDE 50 MG: 25 CAPSULE ORAL at 07:30

## 2021-07-14 RX ADMIN — Medication 50 MCG: at 07:54

## 2021-07-14 RX ADMIN — HALOPERIDOL 10 MG: 5 TABLET ORAL at 21:26

## 2021-07-14 RX ADMIN — FLUPHENAZINE HYDROCHLORIDE 0.5 MG: 1 TABLET, FILM COATED ORAL at 07:54

## 2021-07-14 RX ADMIN — FLUPHENAZINE HYDROCHLORIDE 5 MG: 5 TABLET, FILM COATED ORAL at 20:41

## 2021-07-14 RX ADMIN — LITHIUM CARBONATE 450 MG: 450 TABLET ORAL at 20:41

## 2021-07-14 RX ADMIN — DIPHENHYDRAMINE HYDROCHLORIDE 50 MG: 25 CAPSULE ORAL at 21:26

## 2021-07-14 RX ADMIN — MULTIPLE VITAMINS W/ MINERALS TAB 1 TABLET: TAB at 20:41

## 2021-07-14 RX ADMIN — CLONIDINE HYDROCHLORIDE 0.1 MG: 0.1 TABLET ORAL at 07:54

## 2021-07-14 ASSESSMENT — ACTIVITIES OF DAILY LIVING (ADL)
HYGIENE/GROOMING: INDEPENDENT
DRESS: INDEPENDENT
LAUNDRY: UNABLE TO COMPLETE
ORAL_HYGIENE: PROMPTS

## 2021-07-14 NOTE — PLAN OF CARE
Face to face shift report received from Rashard ALVAREZ. Rounding completed, pt observed in room awake and alet.  Writer encouraged to go to bed and get some rest.  Pt remains in MHICU for decreased stimul and court.     Problem: Behavioral Health Plan of Care  Goal: Patient-Specific Goal (Individualization)  Description: Pt will sleep 6-8 hours per night.   Pt will eat 50% of meals.   Pt will attend 50% of groups when on open unit.  Pt will comply with nursing assessment as needed.   Pt will accept PRN medications when offered.    7/11 - Pt able to wean from MHICU on a limited basis. This will be reassessed daily. 7/13/21: will try coming to the open unit for breakfast and group today.  Outcome: No Change  Pt slept approx. 3 hours during the night.  She was awake off and on.  Labile, and flight of ideas.  Asked for water/juice and then dumped them down the drain.      Face to face report will be communicated to onckinga RN.    Danisha Torres RN  7/14/2021  6:08 AM

## 2021-07-14 NOTE — PROGRESS NOTES
[facility]  Psychiatric Progress Note      Impression:    This is a 45 year old yo female with a history of bipolar disorder and multiple hospitalizations.    Rohith has been weaning out of the MHICU today, does at times need reminders to maintain appropriate boundaries with peers. She was observed to take a fellow peer's headphones and then start to dance in the middle of the lounge. She did willingly return them. She again slept poorly last night. Was pouring water on her bed, was using her sweatshirt to wipe herself after using the bathroom, then plugged the toilet. Slept about 2.5 hours total. Will continue to increase Lithium and Depakote at bedtime back to previous dosing. She is unable to provide a safe discharge plan. She continues to believe that a patient on the unit is either her  or significant other and she insists that she is going to live in [place] with his family, though her current apartment is in [place]. A petition for commitment was filed today given her continued disorganization and inability to safely and rationally put together an appropriate discharge plan.        Educated regarding medication indications, risks, benefits, side effects, contraindications and possible interactions. Verbally expressed understanding.        Diagnoses:   Bipolar 1 disorder, current episode manic      Attestation:  Patient has been seen and evaluated by me,  Shakira Kilpatrick NP          Interim History:   The patient's care was discussed with the treatment team and chart notes were reviewed.    BEHAVIORAL TEAM DISCUSSION    Progress: Limited. Poor sleep last night again. Continues to verbalize delusional thoughts regarding spirits. Appears to be having and responding to auditory hallucinations.     Continued Stay Criteria/Rationale: continued paranoia and delusional thoughts. Restart on medications.     Medical/Physical: denies acute concerns  Precautions:   Falls precaution?: No  Behavioral  Orders   Procedures     Code 1 - Restrict to Unit     Routine Programming     As clinically indicated     Status 15     Every 15 minutes.       Plan:   Increase Depakote ER to 1,000 mg at bedtime (previous dosing 1500 mg at bedtime)  Continue Lithium  mg at bedtime (previous dosing 600 mg at bedtime)  Continue Prolixin 0.5 mg BID  Utilize PRN medications for agitation/paranoia  Petition for commitment filed today- may need to consider SDM petition for continued neuroleptic use or possibly [place] order if [place] is needed. Has history of Prolixin decanoate every 2 weeks.      Rationale for change in precautions or plan:   Poor sleep, paranoia present      Participants: Shakira Kilpatrick CNP, OT, SW, Dr. Singleton, Nursing          Medications:     Current Facility-Administered Medications Ordered in Epic   Medication Dose Route Frequency Last Rate Last Admin     acetaminophen (TYLENOL) tablet 650 mg  650 mg Oral Q4H PRN   650 mg at 07/11/21 0959     alum & mag hydroxide-simethicone (MAALOX) suspension 30 mL  30 mL Oral Q4H PRN         benztropine (COGENTIN) tablet 1 mg  1 mg Oral BID PRN         cloNIDine (CATAPRES) tablet 0.1 mg  0.1 mg Oral BID PRN   0.1 mg at 07/11/21 1746     diphenhydrAMINE (BENADRYL) injection 25-50 mg  25-50 mg Intramuscular Q6H PRN        Or     diphenhydrAMINE (BENADRYL) capsule 25-50 mg  25-50 mg Oral Q6H PRN   50 mg at 07/12/21 0832     divalproex sodium extended-release (DEPAKOTE ER) 24 hr tablet 750 mg  750 mg Oral At Bedtime         fluPHENAZine (PROLIXIN) half-tab 0.5 mg  0.5 mg Oral BID         haloperidol lactate (HALDOL) injection 5-10 mg  5-10 mg Intramuscular Q6H PRN        Or     haloperidol (HALDOL) tablet 5-10 mg  5-10 mg Oral Q6H PRN   10 mg at 07/12/21 0832     hydrOXYzine (ATARAX) tablet 25-50 mg  25-50 mg Oral Q4H PRN         levothyroxine (SYNTHROID/LEVOTHROID) tablet 50 mcg  50 mcg Oral QAM AC   50 mcg at 07/12/21 0643     lithium (ESKALITH CR/LITHOBID) CR tablet 450 mg   450 mg Oral At Bedtime         LORazepam (ATIVAN) tablet 1-2 mg  1-2 mg Oral Q6H PRN   2 mg at 07/12/21 0832    Or     LORazepam (ATIVAN) injection 1-2 mg  1-2 mg Intramuscular Q6H PRN         melatonin tablet 3 mg  3 mg Oral At Bedtime PRN   3 mg at 07/11/21 2359     multivitamin w/minerals (THERA-VIT-M) tablet 1 tablet  1 tablet Oral Daily         nicotine (NICORETTE) gum 2 mg  2 mg Buccal Q1H PRN         OLANZapine (zyPREXA) tablet 5-10 mg  5-10 mg Oral TID PRN        Or     OLANZapine (zyPREXA) injection 5-10 mg  5-10 mg Intramuscular TID PRN   10 mg at 07/11/21 0918     senna-docusate (SENOKOT-S/PERICOLACE) 8.6-50 MG per tablet 1 tablet  1 tablet Oral BID PRN         Vitamin D3 (CHOLECALCIFEROL) tablet 50 mcg  50 mcg Oral Daily   50 mcg at 07/12/21 0832     No current [facility]-ordered outpatient medications on file.            10 point ROS- denies acute concerns       Allergies:     Allergies   Allergen Reactions     Shellfish-Derived Products Rash            Psychiatric Examination:   BP 98/84   Pulse 96   Temp 97.8  F (36.6  C) (Temporal)   Resp 16   Wt 81.8 kg (180 lb 4.8 oz)   SpO2 99%   Weight is 180 lbs 4.8 oz  There is no height or weight on file to calculate BMI.    Appearance:  awake, alert, dressed in hospital scrubs, appeared as age stated and unkempt  Attitude: cooperative  Eye Contact:  fair  Mood: still labile at times  Affect:  mood congruent  Speech:  clear, coherent, pressured at times  Psychomotor Behavior:  no evidence of tardive dyskinesia, dystonia, or tics  Thought Process:  illogical and tangental  Associations:  loosening of associations present  Thought Content:  Denies suicidal thoughts, denies hallucinations, appears preoccupied, responding to auditory hallucinations  Insight:  limited  Judgment:  limited  Oriented to: person, place  Attention Span and Concentration:  limited  Recent and Remote Memory:  fair  Fund of Knowledge: difficult to assess  Muscle Strength and Tone:  normal  Gait and Station: Normal           Labs:   No results found for this or any previous visit (from the past 24 hour(s)).

## 2021-07-14 NOTE — PROGRESS NOTES
"[facility]  Psychiatric Progress Note      Impression:    This is a 45 year old yo female with a history of bipolar disorder and multiple hospitalizations.    This is my first time meeting with her on this admission.  I have worked with her in the past at a different hospital.  I have seen her more disorganized and more psychotic than this in the past.  It appears she quit taking her Prolixin decannulate in December with some oral medications.  She does not want to start the intramuscular shot but was willing to take the Prolixin.  She is receiving fairly high doses of Haldol as needed.  She appears to have facial grimacing that appears involuntary and not secondary to any type of psychosis.  I asked her about this and she tells me \"Risperdal caused me to have tics\" he does appear to be some type of dystonia but she states it is not painful.  She states it is not new and was caused by Risperdal.  Going to schedule Cogentin 0.5 twice a day to see if this helps when I am meeting with her she is very pressured and difficult to follow she is disorganized though she is not agitated and angry.  Is writing things down that are not pertinent to the subject.  What is your weight.  She then writes down the responses on paper and she writes down what the typical weight she believes a man and a woman would be.  Mood is a bit elated.  She tells me \"I am very simpson I did tell her that get better faster if we increased her Prolixin some    Educated regarding medication indications, risks, benefits, side effects, contraindications and possible interactions. Verbally expressed understanding.        Diagnoses:   Bipolar 1 disorder, current episode manic      Attestation:  Patient has been seen and evaluated by me,  Shy Chance NP          Interim History:   The patient's care was discussed with the treatment team and chart notes were reviewed.          Medications:     Current Facility-Administered " Medications Ordered in Epic   Medication Dose Route Frequency Last Rate Last Admin     acetaminophen (TYLENOL) tablet 650 mg  650 mg Oral Q4H PRN   650 mg at 07/11/21 0959     alum & mag hydroxide-simethicone (MAALOX) suspension 30 mL  30 mL Oral Q4H PRN         benztropine (COGENTIN) tablet 1 mg  1 mg Oral BID PRN         cloNIDine (CATAPRES) tablet 0.1 mg  0.1 mg Oral BID PRN   0.1 mg at 07/11/21 1746     diphenhydrAMINE (BENADRYL) injection 25-50 mg  25-50 mg Intramuscular Q6H PRN        Or     diphenhydrAMINE (BENADRYL) capsule 25-50 mg  25-50 mg Oral Q6H PRN   50 mg at 07/12/21 0832     divalproex sodium extended-release (DEPAKOTE ER) 24 hr tablet 750 mg  750 mg Oral At Bedtime         fluPHENAZine (PROLIXIN) half-tab 0.5 mg  0.5 mg Oral BID         haloperidol lactate (HALDOL) injection 5-10 mg  5-10 mg Intramuscular Q6H PRN        Or     haloperidol (HALDOL) tablet 5-10 mg  5-10 mg Oral Q6H PRN   10 mg at 07/12/21 0832     hydrOXYzine (ATARAX) tablet 25-50 mg  25-50 mg Oral Q4H PRN         levothyroxine (SYNTHROID/LEVOTHROID) tablet 50 mcg  50 mcg Oral QAM AC   50 mcg at 07/12/21 0643     lithium (ESKALITH CR/LITHOBID) CR tablet 450 mg  450 mg Oral At Bedtime         LORazepam (ATIVAN) tablet 1-2 mg  1-2 mg Oral Q6H PRN   2 mg at 07/12/21 0832    Or     LORazepam (ATIVAN) injection 1-2 mg  1-2 mg Intramuscular Q6H PRN         melatonin tablet 3 mg  3 mg Oral At Bedtime PRN   3 mg at 07/11/21 5587     multivitamin w/minerals (THERA-VIT-M) tablet 1 tablet  1 tablet Oral Daily         nicotine (NICORETTE) gum 2 mg  2 mg Buccal Q1H PRN         OLANZapine (zyPREXA) tablet 5-10 mg  5-10 mg Oral TID PRN        Or     OLANZapine (zyPREXA) injection 5-10 mg  5-10 mg Intramuscular TID PRN   10 mg at 07/11/21 0918     senna-docusate (SENOKOT-S/PERICOLACE) 8.6-50 MG per tablet 1 tablet  1 tablet Oral BID PRN         Vitamin D3 (CHOLECALCIFEROL) tablet 50 mcg  50 mcg Oral Daily   50 mcg at 07/12/21 0832     No current  [facility]-ordered outpatient medications on file.            10 point ROS- denies acute concerns       Allergies:     Allergies   Allergen Reactions     Shellfish-Derived Products Rash            Psychiatric Examination:   /93   Pulse 104   Temp 98.9  F (37.2  C) (Temporal)   Resp 18   Wt 81.8 kg (180 lb 4.8 oz)   SpO2 99%   Weight is 180 lbs 4.8 oz  There is no height or weight on file to calculate BMI.    Appearance:  awake, alert, dressed in hospital scrubs, appeared as age stated and unkempt  Attitude: cooperative  Eye Contact:  fair  Mood: still labile at times  Affect:  mood congruent  Speech:  clear, coherent, pressured at times  Psychomotor Behavior:  no evidence of tardive dyskinesia, dystonia, or tics  Thought Process:  illogical and tangental  Associations:  loosening of associations present  Thought Content:  Denies suicidal thoughts, denies hallucinations, appears preoccupied, responding to auditory hallucinations  Insight:  limited  Judgment:  limited  Oriented to: person, place  Attention Span and Concentration:  limited  Recent and Remote Memory:  fair  Fund of Knowledge: difficult to assess  Muscle Strength and Tone: normal  Gait and Station: Normal           Labs:   No results found for this or any previous visit (from the past 24 hour(s)).    BEHAVIORAL TEAM DISCUSSION    Progress: Is having some improvement.  Her thoughts are still disorganized though documentation shows that these are improving she is clearly experiencing auditory hallucinations.    Continued Stay Criteria/Rationale: continued paranoia and delusional thoughts. Restart on medications.     Medical/Physical: denies acute concerns  Precautions:   Falls precaution?: No  Behavioral Orders   Procedures     Code 1 - Restrict to Unit     Routine Programming     As clinically indicated     Status 15     Every 15 minutes.       Plan:     Schedule Cogentin 0.5 mg twice a day    Increase Prolixin to 5 mg twice a day.  She is receiving  fairly large quantities of Haldol as needed and if Prolixin dose was higher she would not require such significant amounts of Haldol.  She has tolerated Prolixin in the past      Increase nightly Depakote 1500 mg      Participants: Shy Chance CNP, OT, SW, Dr. Singleton, Nursing

## 2021-07-14 NOTE — PLAN OF CARE
Received call from Critical access hospital screener Shoaib Paris who states he is going to be calling pt's apartment complex to verify that she is actually living there.     Received email from Shoaib Paris who states he will be supporting the MI petition on pt. [facility].     Received court paperwork from [facility]- served pt with court paper work and placed copy in pt's chart. Pt's confinement hearing is scheduled for Friday July 16th at 10am and the commitment hearing is scheduled for July 23rd at 9:10am. Updated pt's nurse and security. Answered pt's questions regarding upcoming hearing.

## 2021-07-14 NOTE — PLAN OF CARE
"Problem: Behavioral Health Plan of Care  Goal: Patient-Specific Goal (Individualization)  Description: Pt will sleep 6-8 hours per night.   Pt will eat 50% of meals.   Pt will attend 50% of groups when on open unit.  Pt will comply with nursing assessment as needed.   Pt will accept PRN medications when offered.  7/11 - Pt able to wean from MHICU on a limited basis. This will be reassessed daily. 7/13/21: will try coming to the open unit for breakfast and group today.  Outcome: Improving  She is awake on the unit this morning. Her speech is pressured. She was allowed to wean to the open unit briefly this morning. Her behavior was intrusive. Her voice was loud. She was difficult to redirect. She had zyprexa 10 mg and benadryl 50 mg at 0730. She continues to talk about \"I must be paranoid, give me my meds\".  So at 0754 she was given her scheduled morning medication along with clonidine 0.1 mg. BP this morning was 139/79 P 95. She is continuing with escalating behavior. She returns to the MHICU at this time and is noted to try to hold the door of the MHICU open as she doesn't want to be alone. She then proceeds to lay on the floor of the MHICU and scream/cry loudly. She is reminded of what is appropriate behavior is.      Problem: Thought Process Alteration  Goal: Optimal Thought Clarity  Description: Pt will have a linear reality based conversation by discharge.   Outcome: Improving  She talks of continued paranoia. She is not able to manage a reality based conversation. She is tangential, pressured and rambling. She continues to be responding to internal stimuli.      Problem: Suicidal Behavior  Goal: Suicidal Behavior is Absent or Managed  Outcome: Improving  She denies any suicidal thinking. She is agreeable to safe behavior on the unit.     Face to face end of shift report communicated to evening shift RN.     Robert Cortez RN  7/14/2021  3:29 PM           "

## 2021-07-14 NOTE — PLAN OF CARE
"SHIFT SUMMARY:     Denies SI/HI, depression, anxiety, and pain. Appears responding to auditory and visual hallucinations.  Tangential, flight of ideas mostly centered on her being a \"goddess\" and the nurses are \"angels.\" Repeatedly knocking on window into nurse's station. Weaning and appropriate - states she \"needs to be around people.\"    Weaned the majority of this shift.    PRNs:  Cogentin 0.5 mg PO with zyprexa PO  @ 1615 for restlessness and agitation - effective.    At 2130, benadryl 50 mg PO, haldol  50 mg PO and ativan 2 mg PO given for agitation. Effective - sitting in lounge, voice is quieter, less demanding and agitated.    Report given to oncoming shift.    Problem: Behavioral Health Plan of Care  Goal: Patient-Specific Goal (Individualization)  Description: Pt will sleep 6-8 hours per night.   Pt will eat 50% of meals.   Pt will attend 50% of groups when on open unit.  Pt will comply with nursing assessment as needed.   Pt will accept PRN medications when offered.    7/11 - Pt able to wean from MHICU on a limited basis. This will be reassessed daily. 7/13/21: will try coming to the open unit for breakfast and group today.  Outcome: Improving     Problem: Behavioral Health Plan of Care  Goal: Plan of Care Review  Recent Flowsheet Documentation  Taken 7/14/2021 1600 by Rashadr Cameron RN  Plan of Care Reviewed With: patient  Patient Agreement with Plan of Care: agrees     Problem: Behavioral Health Plan of Care  Goal: Absence of New-Onset Illness or Injury  Intervention: Identify and Manage Fall Risk  Recent Flowsheet Documentation  Taken 7/14/2021 1600 by Rashard Cameron RN  Safety Measures: self-directed behavior promoted     Problem: Behavioral Health Plan of Care  Goal: Develops/Participates in Therapeutic Anita to Support Successful Transition  Intervention: Foster Therapeutic Anita  Recent Flowsheet Documentation  Taken 7/14/2021 1600 by Rashard Cameron RN  Trust Relationship/Rapport:    care " explained    choices provided    emotional support provided    empathic listening provided    questions answered    questions encouraged    reassurance provided    thoughts/feelings acknowledged
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
## 2021-07-15 PROCEDURE — 124N000001 HC R&B MH

## 2021-07-15 PROCEDURE — 250N000013 HC RX MED GY IP 250 OP 250 PS 637: Performed by: PSYCHIATRY & NEUROLOGY

## 2021-07-15 PROCEDURE — 250N000013 HC RX MED GY IP 250 OP 250 PS 637: Performed by: NURSE PRACTITIONER

## 2021-07-15 RX ADMIN — LORAZEPAM 1 MG: 1 TABLET ORAL at 23:38

## 2021-07-15 RX ADMIN — LORAZEPAM 2 MG: 1 TABLET ORAL at 12:58

## 2021-07-15 RX ADMIN — HALOPERIDOL 10 MG: 5 TABLET ORAL at 12:59

## 2021-07-15 RX ADMIN — FLUPHENAZINE HYDROCHLORIDE 5 MG: 5 TABLET, FILM COATED ORAL at 08:20

## 2021-07-15 RX ADMIN — ALUMINUM HYDROXIDE, MAGNESIUM HYDROXIDE, AND SIMETHICONE 30 ML: 200; 200; 20 SUSPENSION ORAL at 02:55

## 2021-07-15 RX ADMIN — DIPHENHYDRAMINE HYDROCHLORIDE 50 MG: 25 CAPSULE ORAL at 03:27

## 2021-07-15 RX ADMIN — LITHIUM CARBONATE 450 MG: 450 TABLET ORAL at 21:39

## 2021-07-15 RX ADMIN — DIVALPROEX SODIUM 1500 MG: 500 TABLET, FILM COATED, EXTENDED RELEASE ORAL at 21:39

## 2021-07-15 RX ADMIN — FLUPHENAZINE HYDROCHLORIDE 7.5 MG: 5 TABLET, FILM COATED ORAL at 21:39

## 2021-07-15 RX ADMIN — DIPHENHYDRAMINE HYDROCHLORIDE 50 MG: 25 CAPSULE ORAL at 12:58

## 2021-07-15 RX ADMIN — MULTIPLE VITAMINS W/ MINERALS TAB 1 TABLET: TAB at 17:24

## 2021-07-15 RX ADMIN — HALOPERIDOL 10 MG: 5 TABLET ORAL at 03:27

## 2021-07-15 RX ADMIN — LORAZEPAM 2 MG: 1 TABLET ORAL at 03:27

## 2021-07-15 RX ADMIN — BENZTROPINE MESYLATE 0.5 MG: 0.5 TABLET ORAL at 21:39

## 2021-07-15 RX ADMIN — BENZTROPINE MESYLATE 0.5 MG: 0.5 TABLET ORAL at 08:20

## 2021-07-15 RX ADMIN — Medication 50 MCG: at 08:20

## 2021-07-15 RX ADMIN — DIPHENHYDRAMINE HYDROCHLORIDE 50 MG: 25 CAPSULE ORAL at 23:38

## 2021-07-15 RX ADMIN — LEVOTHYROXINE SODIUM 50 MCG: 0.05 TABLET ORAL at 06:09

## 2021-07-15 RX ADMIN — LORAZEPAM 1.5 MG: 1 TABLET ORAL at 21:39

## 2021-07-15 RX ADMIN — HALOPERIDOL 10 MG: 5 TABLET ORAL at 23:38

## 2021-07-15 RX ADMIN — CLONIDINE HYDROCHLORIDE 0.1 MG: 0.1 TABLET ORAL at 08:21
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
## 2021-07-15 NOTE — PLAN OF CARE
"Problem: Behavioral Health Plan of Care  Goal: Patient-Specific Goal (Individualization)  Description: Pt will sleep 6-8 hours per night.   Pt will eat 50% of meals.   Pt will attend 50% of groups when on open unit.  Pt will comply with nursing assessment as needed.   Pt will accept PRN medications when offered.  7/11 - Pt able to wean from MHICU on a limited basis. This will be reassessed daily. 7/13/21: will try coming to the open unit for breakfast and group today.  Outcome: Improving  She is awake in the MHICU. She is labile. She is writing on multiple papers random words, \"father MAX, son ashwini, NINI.S love triangle one God in B\", \"I am more adept to pain then you are over the years\", \"police elvis with each other\".  She is encouraged to rest as she did not sleep last night. She is given clonidine 0.1 mg at 0821 for increased anxiety and BP noted to be 150/85, P 93. She paces in the hallway or is noted to be organizing and writing on papers.  She did rest in bed for about 30 minutes prior to lunch.     Problem: Thought Process Alteration  Goal: Optimal Thought Clarity  Description: Pt will have a linear reality based conversation by discharge.   Outcome: Improving  She continues with disorganized behavior. She continues to talk about \"max\", her  and is referring to a patient on the open unit. She is not able to manage a linear conversation.   1258: patient continues to be labile, disorganized, delusional. She is now focused on a different male patient, calling him her boyfriend. \"Juan was the chief  of [employer] and [employer], Heir to (money?)\", \"remember we are not in heaven yet\".  She is given haldol 10 mg, ativan 2mg, and benadryl 50mg orally at this time.  She remains in the MHICU for decreased stimulation as she is intimidating to other patients on the open unit (per other patients)    Problem: Suicidal Behavior  Goal: Suicidal Behavior is Absent or Managed  Outcome: Improving  She is not " feeling suicidal. She is agreeable to safe behavior on the unit.    Face to face end of shift report communicated to evening shift RN.     Robert Cortez RN  7/15/2021  3:25 PM

## 2021-07-15 NOTE — PROGRESS NOTES
"She is perhaps [facility]  Psychiatric Progress Note      Impression:    This is a 45 year old yo female with a history of bipolar disorder and multiple hospitalizations.      Staff has told me that she responded better to Zyprexa in comparison to haldol. I stopped Zyprexa yesterday and continued haldol as she was getting both medications along with her scheduled prolixin. I will stop haldol and start zyprexa prn again.     She was sitting in the lounge in the general unit and was playing cards with another patient. She was crying and quite labile. She states \"im so sad because he is going to be leaving soon.\" she believes this patients name is \"brett\" although he told her what his actual name is she believes his name is brett. She cries loudly \"I cant ever keep a relationship\". She remains delusional appears to be hallucinating and is still not sleeping. Only slept 2 hours last night.  She is taking all of her scheduled medications.     Educated regarding medication indications, risks, benefits, side effects, contraindications and possible interactions. Verbally expressed understanding.        Diagnoses:   Bipolar 1 disorder, current episode manic      Attestation:  Patient has been seen and evaluated by me,  Shy Chance NP          Interim History:   The patient's care was discussed with the treatment team and chart notes were reviewed.          Medications:     Current Facility-Administered Medications Ordered in Epic   Medication Dose Route Frequency Last Rate Last Admin     acetaminophen (TYLENOL) tablet 650 mg  650 mg Oral Q4H PRN   650 mg at 07/11/21 0959     alum & mag hydroxide-simethicone (MAALOX) suspension 30 mL  30 mL Oral Q4H PRN         benztropine (COGENTIN) tablet 1 mg  1 mg Oral BID PRN         cloNIDine (CATAPRES) tablet 0.1 mg  0.1 mg Oral BID PRN   0.1 mg at 07/11/21 1746     diphenhydrAMINE (BENADRYL) injection 25-50 mg  25-50 mg Intramuscular Q6H PRN        Or     " diphenhydrAMINE (BENADRYL) capsule 25-50 mg  25-50 mg Oral Q6H PRN   50 mg at 07/12/21 0832     divalproex sodium extended-release (DEPAKOTE ER) 24 hr tablet 750 mg  750 mg Oral At Bedtime         fluPHENAZine (PROLIXIN) half-tab 0.5 mg  0.5 mg Oral BID         haloperidol lactate (HALDOL) injection 5-10 mg  5-10 mg Intramuscular Q6H PRN        Or     haloperidol (HALDOL) tablet 5-10 mg  5-10 mg Oral Q6H PRN   10 mg at 07/12/21 0832     hydrOXYzine (ATARAX) tablet 25-50 mg  25-50 mg Oral Q4H PRN         levothyroxine (SYNTHROID/LEVOTHROID) tablet 50 mcg  50 mcg Oral QAM AC   50 mcg at 07/12/21 0643     lithium (ESKALITH CR/LITHOBID) CR tablet 450 mg  450 mg Oral At Bedtime         LORazepam (ATIVAN) tablet 1-2 mg  1-2 mg Oral Q6H PRN   2 mg at 07/12/21 0832    Or     LORazepam (ATIVAN) injection 1-2 mg  1-2 mg Intramuscular Q6H PRN         melatonin tablet 3 mg  3 mg Oral At Bedtime PRN   3 mg at 07/11/21 2359     multivitamin w/minerals (THERA-VIT-M) tablet 1 tablet  1 tablet Oral Daily         nicotine (NICORETTE) gum 2 mg  2 mg Buccal Q1H PRN         OLANZapine (zyPREXA) tablet 5-10 mg  5-10 mg Oral TID PRN        Or     OLANZapine (zyPREXA) injection 5-10 mg  5-10 mg Intramuscular TID PRN   10 mg at 07/11/21 0918     senna-docusate (SENOKOT-S/PERICOLACE) 8.6-50 MG per tablet 1 tablet  1 tablet Oral BID PRN         Vitamin D3 (CHOLECALCIFEROL) tablet 50 mcg  50 mcg Oral Daily   50 mcg at 07/12/21 0832     No current Epic-ordered outpatient medications on file.            10 point ROS- denies acute concerns       Allergies:     Allergies   Allergen Reactions     Shellfish-Derived Products Rash            Psychiatric Examination:   /85   Pulse 93   Temp 97.1  F (36.2  C) (Tympanic)   Resp 16   Wt 81.8 kg (180 lb 4.8 oz)   SpO2 100%   Weight is 180 lbs 4.8 oz  There is no height or weight on file to calculate BMI.    Appearance:  awake, alert, dressed in hospital scrubs, appeared as age stated and  unkempt  Attitude: cooperative  Eye Contact:  fair  Mood: still labile at times  Affect:  mood congruent  Speech:  clear, coherent, pressured at times  Psychomotor Behavior:  no evidence of tardive dyskinesia, dystonia, or tics  Thought Process:  illogical and tangental  Associations:  loosening of associations present  Thought Content:  Denies suicidal thoughts, denies hallucinations, appears preoccupied, responding to auditory hallucinations  Insight:  limited  Judgment:  limited  Oriented to: person, place  Attention Span and Concentration:  limited  Recent and Remote Memory:  fair  Fund of Knowledge: difficult to assess  Muscle Strength and Tone: normal  Gait and Station: Normal           Labs:   No results found for this or any previous visit (from the past 24 hour(s)).    BEHAVIORAL TEAM DISCUSSION    Progress: Is having some improvement. Able to come out to mhicu    Continued Stay Criteria/Rationale: continued paranoia and delusional thoughts. Restart on medications. Very labile    Medical/Physical: denies acute concerns  Precautions:   Falls precaution?: No  Behavioral Orders   Procedures     Code 1 - Restrict to Unit     Routine Programming     As clinically indicated     Status 15     Every 15 minutes.       Plan:     Increase lithium 600 mg hs then tomorrow 900 mg     Continue Depakote        Stop haldol and start prn zyprexa      Increase prolixin      Cmp, cbc and ammonia for tomorrow    Participants: Shy Chance CNP, OT, SW, Dr. Singleton, Nursing

## 2021-07-15 NOTE — PLAN OF CARE
Problem: Behavioral Health Plan of Care  Goal: Patient-Specific Goal (Individualization)  Description: Pt will sleep 6-8 hours per night.   Pt will eat 50% of meals.   Pt will attend 50% of groups when on open unit.  Pt will comply with nursing assessment as needed.   Pt will accept PRN medications when offered.    7/11 - Pt able to wean from MHICU on a limited basis. This will be reassessed daily. 7/13/21: will try coming to the open unit for breakfast and group today.  Outcome: No Change  Note:        Problem: Suicidal Behavior  Goal: Suicidal Behavior is Absent or Managed  Outcome: No Change     Face to face shift report received from Rashard ALVAREZ. Rounding completed, pt observed.

## 2021-07-15 NOTE — PLAN OF CARE
1:1 time with the patient.  No changes made to the discharge plan at this time.   See the AVS for follow up appointments and recommendations.     Patient signed the CORTNEY for her .

## 2021-07-16 PROCEDURE — 250N000013 HC RX MED GY IP 250 OP 250 PS 637: Performed by: NURSE PRACTITIONER

## 2021-07-16 PROCEDURE — 124N000001 HC R&B MH

## 2021-07-16 PROCEDURE — 99233 SBSQ HOSP IP/OBS HIGH 50: CPT | Performed by: NURSE PRACTITIONER

## 2021-07-16 RX ORDER — FLUPHENAZINE HYDROCHLORIDE 5 MG/1
10 TABLET ORAL 2 TIMES DAILY
Status: DISCONTINUED | OUTPATIENT
Start: 2021-07-16 | End: 2021-08-03 | Stop reason: HOSPADM

## 2021-07-16 RX ORDER — LITHIUM CARBONATE 300 MG/1
600 TABLET, FILM COATED, EXTENDED RELEASE ORAL AT BEDTIME
Status: COMPLETED | OUTPATIENT
Start: 2021-07-16 | End: 2021-07-16

## 2021-07-16 RX ORDER — OLANZAPINE 10 MG/2ML
10 INJECTION, POWDER, FOR SOLUTION INTRAMUSCULAR 2 TIMES DAILY PRN
Status: DISCONTINUED | OUTPATIENT
Start: 2021-07-16 | End: 2021-07-30

## 2021-07-16 RX ORDER — LITHIUM CARBONATE 300 MG/1
600 TABLET, FILM COATED, EXTENDED RELEASE ORAL AT BEDTIME
Status: DISCONTINUED | OUTPATIENT
Start: 2021-07-16 | End: 2021-07-16

## 2021-07-16 RX ORDER — OLANZAPINE 10 MG/1
10 TABLET ORAL 2 TIMES DAILY PRN
Status: DISCONTINUED | OUTPATIENT
Start: 2021-07-16 | End: 2021-07-30

## 2021-07-16 RX ORDER — LITHIUM CARBONATE 450 MG
900 TABLET, EXTENDED RELEASE ORAL AT BEDTIME
Status: DISCONTINUED | OUTPATIENT
Start: 2021-07-17 | End: 2021-07-30

## 2021-07-16 RX ADMIN — FLUPHENAZINE HYDROCHLORIDE 7.5 MG: 5 TABLET, FILM COATED ORAL at 08:22

## 2021-07-16 RX ADMIN — BENZTROPINE MESYLATE 0.5 MG: 0.5 TABLET ORAL at 20:22

## 2021-07-16 RX ADMIN — LORAZEPAM 1.5 MG: 1 TABLET ORAL at 06:20

## 2021-07-16 RX ADMIN — DIPHENHYDRAMINE HYDROCHLORIDE 50 MG: 25 CAPSULE ORAL at 16:50

## 2021-07-16 RX ADMIN — LITHIUM CARBONATE 600 MG: 300 TABLET, EXTENDED RELEASE ORAL at 20:23

## 2021-07-16 RX ADMIN — FLUPHENAZINE HYDROCHLORIDE 10 MG: 5 TABLET, FILM COATED ORAL at 20:22

## 2021-07-16 RX ADMIN — LORAZEPAM 1.5 MG: 1 TABLET ORAL at 20:22

## 2021-07-16 RX ADMIN — OLANZAPINE 10 MG: 10 TABLET, FILM COATED ORAL at 11:41

## 2021-07-16 RX ADMIN — MULTIPLE VITAMINS W/ MINERALS TAB 1 TABLET: TAB at 16:49

## 2021-07-16 RX ADMIN — DIVALPROEX SODIUM 1500 MG: 500 TABLET, FILM COATED, EXTENDED RELEASE ORAL at 20:22

## 2021-07-16 RX ADMIN — BENZTROPINE MESYLATE 0.5 MG: 0.5 TABLET ORAL at 08:22

## 2021-07-16 RX ADMIN — Medication 50 MCG: at 08:22

## 2021-07-16 RX ADMIN — ACETAMINOPHEN 650 MG: 325 TABLET, FILM COATED ORAL at 06:20

## 2021-07-16 RX ADMIN — LORAZEPAM 2 MG: 1 TABLET ORAL at 16:50

## 2021-07-16 RX ADMIN — LORAZEPAM 1.5 MG: 1 TABLET ORAL at 13:47

## 2021-07-16 RX ADMIN — LEVOTHYROXINE SODIUM 50 MCG: 0.05 TABLET ORAL at 06:20

## 2021-07-16 RX ADMIN — ACETAMINOPHEN 650 MG: 325 TABLET, FILM COATED ORAL at 00:10

## 2021-07-16 ASSESSMENT — ACTIVITIES OF DAILY LIVING (ADL)
HYGIENE/GROOMING: INDEPENDENT
DRESS: INDEPENDENT
ORAL_HYGIENE: INDEPENDENT

## 2021-07-16 NOTE — PLAN OF CARE
"Problem: Behavioral Health Plan of Care  Goal: Patient-Specific Goal (Individualization)  Description: Pt will sleep 6-8 hours per night.   Pt will eat 50% of meals.   Pt will attend 50% of groups when on open unit when able.  Pt will comply with nursing assessment as needed.   Pt will accept PRN medications when offered.  7/11 - Pt able to wean from MHICU on a limited basis. She is not appropriate for groups at this time. This will be reassessed daily.   Outcome: Improving  She is up on the unit. She is weaning to the open unit while others are in group. She is maintaining appropriate boundaries with staff and peers with minimal reminders. She continues with delusional thinking. She talks now of \"Shoaib\" being her fiancee and he is in [place]. \"He chose my room mate, top notch\". She is noted to sing loudly in the lounge when peers are in group. She is given benadryl 50 mg and ativan 2mg for increasing agitation and anxiety. She requested to eat supper in the lounge and this request was granted. She will return to the MHICU for self care after supper.   2100: she weaned to the open unit for snack. She maintained appropriate boundaries. When she returned to the MHICU she states that her name is now \"Gregoria Rosa\". \"He decided to change it for me\". When asked who he was she states \"Shoaib, my fiancee\".       Problem: Thought Process Alteration  Goal: Optimal Thought Clarity  Description: Pt will have a linear reality based conversation by discharge.   Outcome: Improving  She continues with delusional thinking. She is preoccupied and responding to internal stimuli. She stops activities and will act as if she is listening to someone, then she will respond to that conversation out loud.     Face to face end of shift report to be communicated to night shift RN.     Robert Cortez RN  7/16/2021  10:52 PM            "

## 2021-07-16 NOTE — PLAN OF CARE
Pt had her confinement hearing via [facility] with [facility]. Pt was ordered a confinement and will have her commitment hearing next week July 23rd @ 9:10am. During pt's hearing, she requested to the  that she would like to change her name. The  clarified his roles and diverted pt to talking to her  about this.    Received confinement order from [facility] court admin. Put a copy in pt's chart and put pt's copy in her black folder, per her request.

## 2021-07-16 NOTE — PLAN OF CARE
Face to face shift report received from nurse. Rounding completed, pt observed.    Problem: Behavioral Health Plan of Care  Goal: Patient-Specific Goal (Individualization)  Description: Pt will sleep 6-8 hours per night.   Pt will eat 50% of meals.   Pt will attend 50% of groups when on open unit.  Pt will comply with nursing assessment as needed.   Pt will accept PRN medications when offered.    7/15 - Pt able to wean from MHICU on a limited basis. This will be reassessed daily. 7/15/21: will try coming to the open unit for supper and groups afterward.  Outcome: No Change  Note: Patient was able to ween most of evening shift. Was mostly appropriate. At 1000 patient returned to MHICU no issues. Patient attended groups. Patient only slept about 2.5 hours throughout the night. Gave benadryl 50mg, haldol 10mg, and ativan 1mg at 2338. Patient was constantly at the door almost every 10 minutes when awake.       Problem: Thought Process Alteration  Goal: Optimal Thought Clarity  Description: Pt will have a linear reality based conversation by discharge.   Outcome: No Change     Problem: Suicidal Behavior  Goal: Suicidal Behavior is Absent or Managed  Outcome: No Change     Face to face report will be communicated to oncoming RN.    Rex Skelton RN  7/15/2021

## 2021-07-16 NOTE — PLAN OF CARE
"  Problem: Behavioral Health Plan of Care  Goal: Patient-Specific Goal (Individualization)  Description: Pt will sleep 6-8 hours per night.   Pt will eat 50% of meals.   Pt will attend 50% of groups when on open unit.  Pt will comply with nursing assessment as needed.   Pt will accept PRN medications when offered.    7/11 - Pt able to wean from MHICU on a limited basis. This will be reassessed daily. 7/13/21: will try coming to the open unit for breakfast and group today.  Outcome: Improving  Note: Shift Summery:      0900 Patient makes requests for items she feels she needs for court this morning. Patient has attended court and has been tearful since then. Patient states that she thinks another patient is her boyfriend and that she is going to get . Patient also insists that he gave her a pair of shoes to trade with her and that patient denies this. Patient ate well for breakfast meal. Denies physical problems. Allowing patient to wean and patient has done fairly well with this today.    1141 Patient up on the unit and weaning from MHICU. Continues to insist male patient is her fiance and patient becoming loud and tearful. Offered patient Zyprexa 10 mg po at this time and patient accepted this with no issue.    1420 Patient up on the unit and continues to wean. Patient behavior is fairly appropriate and redirects fairly well. Patient alert and denies any further issues.    Face to face end of shift report communicated to 3-11 shift RN.     Kerry Velasquez RN  7/16/2021  2:44 PM        Patient's Stated Goal for Shift:  \"no stated goal\"      Goal Status:  In Process       Problem: Thought Process Alteration  Goal: Optimal Thought Clarity  Description: Pt will have a linear reality based conversation by discharge.   Outcome: Improving     Problem: Suicidal Behavior  Goal: Suicidal Behavior is Absent or Managed  Outcome: Improving     "

## 2021-07-17 LAB
ALBUMIN SERPL-MCNC: 3.5 G/DL (ref 3.4–5)
ALP SERPL-CCNC: 55 U/L (ref 40–150)
ALT SERPL W P-5'-P-CCNC: 25 U/L (ref 0–50)
AMMONIA PLAS-SCNC: 37 UMOL/L (ref 10–50)
ANION GAP SERPL CALCULATED.3IONS-SCNC: 4 MMOL/L (ref 3–14)
AST SERPL W P-5'-P-CCNC: 7 U/L (ref 0–45)
BASOPHILS # BLD AUTO: 0 10E3/UL (ref 0–0.2)
BASOPHILS NFR BLD AUTO: 0 %
BILIRUB SERPL-MCNC: 0.2 MG/DL (ref 0.2–1.3)
BUN SERPL-MCNC: 9 MG/DL (ref 7–30)
CALCIUM SERPL-MCNC: 9.1 MG/DL (ref 8.5–10.1)
CHLORIDE BLD-SCNC: 113 MMOL/L (ref 94–109)
CO2 SERPL-SCNC: 23 MMOL/L (ref 20–32)
CREAT SERPL-MCNC: 0.78 MG/DL (ref 0.52–1.04)
EOSINOPHIL # BLD AUTO: 0.1 10E3/UL (ref 0–0.7)
EOSINOPHIL NFR BLD AUTO: 3 %
ERYTHROCYTE [DISTWIDTH] IN BLOOD BY AUTOMATED COUNT: 13 % (ref 10–15)
GFR SERPL CREATININE-BSD FRML MDRD: >90 ML/MIN/1.73M2
GLUCOSE BLD-MCNC: 76 MG/DL (ref 70–99)
HCT VFR BLD AUTO: 35.8 % (ref 35–47)
HGB BLD-MCNC: 11.8 G/DL (ref 11.7–15.7)
IMM GRANULOCYTES # BLD: 0 10E3/UL
IMM GRANULOCYTES NFR BLD: 0 %
LYMPHOCYTES # BLD AUTO: 2.1 10E3/UL (ref 0.8–5.3)
LYMPHOCYTES NFR BLD AUTO: 40 %
MCH RBC QN AUTO: 29.2 PG (ref 26.5–33)
MCHC RBC AUTO-ENTMCNC: 33 G/DL (ref 31.5–36.5)
MCV RBC AUTO: 89 FL (ref 78–100)
MONOCYTES # BLD AUTO: 0.6 10E3/UL (ref 0–1.3)
MONOCYTES NFR BLD AUTO: 11 %
NEUTROPHILS # BLD AUTO: 2.4 10E3/UL (ref 1.6–8.3)
NEUTROPHILS NFR BLD AUTO: 46 %
NRBC # BLD AUTO: 0 10E3/UL
NRBC BLD AUTO-RTO: 0 /100
PLATELET # BLD AUTO: 161 10E3/UL (ref 150–450)
POTASSIUM BLD-SCNC: 4.4 MMOL/L (ref 3.4–5.3)
PROT SERPL-MCNC: 6.8 G/DL (ref 6.8–8.8)
RBC # BLD AUTO: 4.04 10E6/UL (ref 3.8–5.2)
SODIUM SERPL-SCNC: 140 MMOL/L (ref 133–144)
WBC # BLD AUTO: 5.3 10E3/UL (ref 4–11)

## 2021-07-17 PROCEDURE — 80053 COMPREHEN METABOLIC PANEL: CPT | Performed by: NURSE PRACTITIONER

## 2021-07-17 PROCEDURE — 124N000001 HC R&B MH

## 2021-07-17 PROCEDURE — 250N000013 HC RX MED GY IP 250 OP 250 PS 637: Performed by: NURSE PRACTITIONER

## 2021-07-17 PROCEDURE — 250N000011 HC RX IP 250 OP 636: Performed by: NURSE PRACTITIONER

## 2021-07-17 PROCEDURE — 99233 SBSQ HOSP IP/OBS HIGH 50: CPT | Performed by: NURSE PRACTITIONER

## 2021-07-17 PROCEDURE — 85025 COMPLETE CBC W/AUTO DIFF WBC: CPT | Performed by: NURSE PRACTITIONER

## 2021-07-17 PROCEDURE — 82140 ASSAY OF AMMONIA: CPT | Performed by: NURSE PRACTITIONER

## 2021-07-17 PROCEDURE — 36415 COLL VENOUS BLD VENIPUNCTURE: CPT | Performed by: NURSE PRACTITIONER

## 2021-07-17 RX ADMIN — LORAZEPAM 1.5 MG: 1 TABLET ORAL at 20:08

## 2021-07-17 RX ADMIN — DIVALPROEX SODIUM 1500 MG: 500 TABLET, FILM COATED, EXTENDED RELEASE ORAL at 20:07

## 2021-07-17 RX ADMIN — Medication 50 MCG: at 08:55

## 2021-07-17 RX ADMIN — LORAZEPAM 1.5 MG: 1 TABLET ORAL at 06:26

## 2021-07-17 RX ADMIN — MULTIPLE VITAMINS W/ MINERALS TAB 1 TABLET: TAB at 17:16

## 2021-07-17 RX ADMIN — FLUPHENAZINE HYDROCHLORIDE 10 MG: 5 TABLET, FILM COATED ORAL at 20:08

## 2021-07-17 RX ADMIN — BENZTROPINE MESYLATE 0.5 MG: 0.5 TABLET ORAL at 08:55

## 2021-07-17 RX ADMIN — LITHIUM CARBONATE 900 MG: 450 TABLET, EXTENDED RELEASE ORAL at 20:08

## 2021-07-17 RX ADMIN — ACETAMINOPHEN 650 MG: 325 TABLET, FILM COATED ORAL at 01:51

## 2021-07-17 RX ADMIN — FLUPHENAZINE HYDROCHLORIDE 10 MG: 5 TABLET, FILM COATED ORAL at 08:55

## 2021-07-17 RX ADMIN — BENZTROPINE MESYLATE 0.5 MG: 0.5 TABLET ORAL at 20:08

## 2021-07-17 RX ADMIN — LORAZEPAM 2 MG: 2 INJECTION INTRAMUSCULAR; INTRAVENOUS at 01:51

## 2021-07-17 RX ADMIN — LORAZEPAM 1.5 MG: 1 TABLET ORAL at 13:03

## 2021-07-17 RX ADMIN — OLANZAPINE 10 MG: 10 TABLET, FILM COATED ORAL at 01:36

## 2021-07-17 RX ADMIN — LEVOTHYROXINE SODIUM 50 MCG: 0.05 TABLET ORAL at 06:27

## 2021-07-17 ASSESSMENT — ACTIVITIES OF DAILY LIVING (ADL)
DRESS: INDEPENDENT
HYGIENE/GROOMING: INDEPENDENT
HYGIENE/GROOMING: INDEPENDENT
ORAL_HYGIENE: INDEPENDENT
LAUNDRY: UNABLE TO COMPLETE
DRESS: INDEPENDENT
ORAL_HYGIENE: INDEPENDENT

## 2021-07-17 NOTE — PROGRESS NOTES
" [facility]  Psychiatric Progress Note      Impression:    This is a 45 year old yo female with a history of bipolar disorder and multiple hospitalizations.    I spoke to pt 3 times today while attempting to gather information. She was dismissive each time, answering only 1-2 questions then making comments about my hair \"the color of sumac\".   She is receiving prn's along with her scheduled medications. Mood is labile, appears to be responding to internal stimuli. Will play cards with others on the unit for short periods of time and then will just get up and walk away.    Sleep somewhat improved, slept 5.5 hours last night.   Labs are stable.       Educated regarding medication indications, risks, benefits, side effects, contraindications and possible interactions. Verbally expressed understanding.        Diagnoses:   Bipolar 1 disorder, current episode manic      Attestation:  Patient has been seen and evaluated by me,  PHILL Palacios CNP          Interim History:   The patient's care was discussed with the treatment team and chart notes were reviewed.          Medications:     Current Facility-Administered Medications Ordered in Epic   Medication Dose Route Frequency Last Rate Last Admin     acetaminophen (TYLENOL) tablet 650 mg  650 mg Oral Q4H PRN   650 mg at 07/11/21 0959     alum & mag hydroxide-simethicone (MAALOX) suspension 30 mL  30 mL Oral Q4H PRN         benztropine (COGENTIN) tablet 1 mg  1 mg Oral BID PRN         cloNIDine (CATAPRES) tablet 0.1 mg  0.1 mg Oral BID PRN   0.1 mg at 07/11/21 1746     diphenhydrAMINE (BENADRYL) injection 25-50 mg  25-50 mg Intramuscular Q6H PRN        Or     diphenhydrAMINE (BENADRYL) capsule 25-50 mg  25-50 mg Oral Q6H PRN   50 mg at 07/12/21 0832     divalproex sodium extended-release (DEPAKOTE ER) 24 hr tablet 750 mg  750 mg Oral At Bedtime         fluPHENAZine (PROLIXIN) half-tab 0.5 mg  0.5 mg Oral BID         haloperidol lactate (HALDOL) injection " 5-10 mg  5-10 mg Intramuscular Q6H PRN        Or     haloperidol (HALDOL) tablet 5-10 mg  5-10 mg Oral Q6H PRN   10 mg at 07/12/21 0832     hydrOXYzine (ATARAX) tablet 25-50 mg  25-50 mg Oral Q4H PRN         levothyroxine (SYNTHROID/LEVOTHROID) tablet 50 mcg  50 mcg Oral QAM AC   50 mcg at 07/12/21 0643     lithium (ESKALITH CR/LITHOBID) CR tablet 450 mg  450 mg Oral At Bedtime         LORazepam (ATIVAN) tablet 1-2 mg  1-2 mg Oral Q6H PRN   2 mg at 07/12/21 0832    Or     LORazepam (ATIVAN) injection 1-2 mg  1-2 mg Intramuscular Q6H PRN         melatonin tablet 3 mg  3 mg Oral At Bedtime PRN   3 mg at 07/11/21 2359     multivitamin w/minerals (THERA-VIT-M) tablet 1 tablet  1 tablet Oral Daily         nicotine (NICORETTE) gum 2 mg  2 mg Buccal Q1H PRN         OLANZapine (zyPREXA) tablet 5-10 mg  5-10 mg Oral TID PRN        Or     OLANZapine (zyPREXA) injection 5-10 mg  5-10 mg Intramuscular TID PRN   10 mg at 07/11/21 0918     senna-docusate (SENOKOT-S/PERICOLACE) 8.6-50 MG per tablet 1 tablet  1 tablet Oral BID PRN         Vitamin D3 (CHOLECALCIFEROL) tablet 50 mcg  50 mcg Oral Daily   50 mcg at 07/12/21 0832     No current Epic-ordered outpatient medications on file.            10 point ROS- denies acute concerns       Allergies:     Allergies   Allergen Reactions     Shellfish-Derived Products Rash            Psychiatric Examination:   /79   Pulse 117   Temp 98.6  F (37  C) (Temporal)   Resp 16   Wt 81.8 kg (180 lb 4.8 oz)   SpO2 99%   Weight is 180 lbs 4.8 oz  There is no height or weight on file to calculate BMI.    Appearance:  awake, alert, dressed in hospital scrubs, appeared as age stated and unkempt  Attitude: cooperative but dismissive   Eye Contact:  fair  Mood: still labile at times  Affect:  mood congruent  Speech:  clear, coherent, pressured at times  Psychomotor Behavior:  no evidence of tardive dyskinesia, dystonia, or tics  Thought Process:  illogical and tangental  Associations:   loosening of associations present  Thought Content:  Denies suicidal thoughts, denies hallucinations, appears preoccupied, responding to auditory hallucinations  Insight:  limited  Judgment:  limited  Oriented to: person, place  Attention Span and Concentration:  limited  Recent and Remote Memory:  fair  Fund of Knowledge: difficult to assess  Muscle Strength and Tone: normal  Gait and Station: Normal           Labs:     Results for orders placed or performed during the hospital encounter of 07/10/21 (from the past 24 hour(s))   CBC with Platelets & Differential    Narrative    The following orders were created for panel order CBC with Platelets & Differential.  Procedure                               Abnormality         Status                     ---------                               -----------         ------                     CBC with platelets and d...[[id number]]                      Final result                 Please view results for these tests on the individual orders.   Comprehensive metabolic panel   Result Value Ref Range    Sodium 140 133 - 144 mmol/L    Potassium 4.4 3.4 - 5.3 mmol/L    Chloride 113 (H) 94 - 109 mmol/L    Carbon Dioxide (CO2) 23 20 - 32 mmol/L    Anion Gap 4 3 - 14 mmol/L    Urea Nitrogen 9 7 - 30 mg/dL    Creatinine 0.78 0.52 - 1.04 mg/dL    Calcium 9.1 8.5 - 10.1 mg/dL    Glucose 76 70 - 99 mg/dL    Alkaline Phosphatase 55 40 - 150 U/L    AST 7 0 - 45 U/L    ALT 25 0 - 50 U/L    Protein Total 6.8 6.8 - 8.8 g/dL    Albumin 3.5 3.4 - 5.0 g/dL    Bilirubin Total 0.2 0.2 - 1.3 mg/dL    GFR Estimate >90 >60 mL/min/1.73m2   Ammonia   Result Value Ref Range    Ammonia 37 10 - 50 umol/L   CBC with platelets and differential   Result Value Ref Range    WBC Count 5.3 4.0 - 11.0 10e3/uL    RBC Count 4.04 3.80 - 5.20 10e6/uL    Hemoglobin 11.8 11.7 - 15.7 g/dL    Hematocrit 35.8 35.0 - 47.0 %    MCV 89 78 - 100 fL    MCH 29.2 26.5 - 33.0 pg    MCHC 33.0 31.5 - 36.5 g/dL    RDW 13.0 10.0 -  15.0 %    Platelet Count 161 150 - 450 10e3/uL    % Neutrophils 46 %    % Lymphocytes 40 %    % Monocytes 11 %    % Eosinophils 3 %    % Basophils 0 %    % Immature Granulocytes 0 %    NRBCs per 100 WBC 0 <1 /100    Absolute Neutrophils 2.4 1.6 - 8.3 10e3/uL    Absolute Lymphocytes 2.1 0.8 - 5.3 10e3/uL    Absolute Monocytes 0.6 0.0 - 1.3 10e3/uL    Absolute Eosinophils 0.1 0.0 - 0.7 10e3/uL    Absolute Basophils 0.0 0.0 - 0.2 10e3/uL    Absolute Immature Granulocytes 0.0 <=0.0 10e3/uL    Absolute NRBCs 0.0 10e3/uL       BEHAVIORAL TEAM DISCUSSION    Progress: Is having some improvement. Able to come out to mhicu    Continued Stay Criteria/Rationale: continued paranoia and delusional thoughts. Restart on medications. Very labile    Medical/Physical: denies acute concerns  Precautions:   Falls precaution?: No  Behavioral Orders   Procedures     Code 1 - Restrict to Unit     Routine Programming     As clinically indicated     Status 15     Every 15 minutes.       Plan:     Continue lithium 900 mg at hs    Continue Depakote      Utilize PRN olanzapine when needed      Continue prolixin      Cmp, cbc and ammonia within normal range    Participants: Sherry Petty CNP, Nursing

## 2021-07-17 NOTE — PLAN OF CARE
Problem: Behavioral Health Plan of Care  Goal: Patient-Specific Goal (Individualization)  Description: Pt will sleep 6-8 hours per night.   Pt will eat 50% of meals.   Pt will attend 50% of groups when on open unit when able.  Pt will comply with nursing assessment as needed.   Pt will accept PRN medications when offered.    7/11 - Pt able to wean from MHICU on a limited basis. She is not appropriate for groups at this time. This will be reassessed daily.   Outcome: Improving  Note:        Problem: Thought Process Alteration  Goal: Optimal Thought Clarity  Description: Pt will have a linear reality based conversation by discharge.   Outcome: Improving   Pt. In MHICU at beginning of shift, resting quietly in bed, weaning to open unit beginning at suppertime, slept 5.5 hours last night, eating at least 50% of meals, speech is rapid and rambling at times, difficult to understand speech at times, talking on phone frequently, has maintained appropriate boundaries so far this shift.  2130-  Pt. Attended hs group, behavior appropriate, weaned to open unit most of shift successfully, to bed at this time.    Face to face end of shift report will be communicated to oncoming night shift RN.     Eri Clifford RN  7/17/2021  6:41 PM

## 2021-07-17 NOTE — PLAN OF CARE
"SHIFT SUMMARY:    Denies pain, SI/HI, AH/VH, anxiety and depression.     Toilet and sink were observed to be full of toilet paper.    Weaning from MHICU to open floor. Singing moderately loudly, and given reminders to be respectful of others and to keep her voice at a conversational volume - states \"OK, I can't do that, so I'll just stop singing,\" which she did do.     At 1300, she was loudly having a phone conversation - scheduled ativan 1.5 mg given and walked into her MHICU room. Observed laying on her bed at 1400.    End of shift report given to oncoming nurse.          Problem: Behavioral Health Plan of Care  Goal: Patient-Specific Goal (Individualization)  Description: Pt will sleep 6-8 hours per night.   Pt will eat 50% of meals.   Pt will attend 50% of groups when on open unit when able.  Pt will comply with nursing assessment as needed.   Pt will accept PRN medications when offered.    7/11 - Pt able to wean from MHICU on a limited basis. She is not appropriate for groups at this time. This will be reassessed daily.   Outcome: Improving     Problem: Thought Process Alteration  Goal: Optimal Thought Clarity  Description: Pt will have a linear reality based conversation by discharge.   Outcome: Improving     Problem: Suicidal Behavior  Goal: Suicidal Behavior is Absent or Managed  Outcome: Improving     "

## 2021-07-17 NOTE — PLAN OF CARE
"  Problem: Behavioral Health Plan of Care  Goal: Patient-Specific Goal (Individualization)  Description: Pt will sleep 6-8 hours per night.   Pt will eat 50% of meals.   Pt will attend 50% of groups when on open unit when able.  Pt will comply with nursing assessment as needed.   Pt will accept PRN medications when offered.    7/11 - Pt able to wean from MHICU on a limited basis. She is not appropriate for groups at this time. This will be reassessed daily.   Outcome: Improving  Note: Shift Summery:      0100 Patient has been in bed and appeared to sleep until this time. Patient awake and states she peed herself. Patient provided clean linen and scrubs and given a large snack. At times patient is loud but redirects.    0136 Patient remains awake and getting louder and is difficult to redirect. Offered and accepted Zyprexa 10 mg po at this time.    0151 Patient loudly complains of anxiety attack and then quietly comments on staffs clothing and accessories. Patient unable to redirect to be more quiet and so given Lorazepam 2 mg IM and Tylenol 650 mg po at this time for anxiety and general discomfort. Patient tells writer that she was a size zero model and she was given medications to gain weight.     0600 Patient has been up and down but slept about 5.5 hours this shift.    Face to face end of shift report communicated to 7-3 shift RN.     Kerry Velasquez RN  7/17/2021  6:01 AM        Patient's Stated Goal for Shift:  \"no stated goal\"    Goal Status:  In Process       Problem: Thought Process Alteration  Goal: Optimal Thought Clarity  Description: Pt will have a linear reality based conversation by discharge.   Outcome: Improving     Problem: Suicidal Behavior  Goal: Suicidal Behavior is Absent or Managed  Outcome: Improving     "

## 2021-07-18 LAB — SARS-COV-2 RNA RESP QL NAA+PROBE: NEGATIVE

## 2021-07-18 PROCEDURE — 250N000013 HC RX MED GY IP 250 OP 250 PS 637: Performed by: NURSE PRACTITIONER

## 2021-07-18 PROCEDURE — U0005 INFEC AGEN DETEC AMPLI PROBE: HCPCS | Performed by: NURSE PRACTITIONER

## 2021-07-18 PROCEDURE — 124N000001 HC R&B MH

## 2021-07-18 PROCEDURE — 99232 SBSQ HOSP IP/OBS MODERATE 35: CPT | Performed by: NURSE PRACTITIONER

## 2021-07-18 RX ADMIN — LORAZEPAM 1.5 MG: 1 TABLET ORAL at 14:26

## 2021-07-18 RX ADMIN — LORAZEPAM 1.5 MG: 1 TABLET ORAL at 06:17

## 2021-07-18 RX ADMIN — FLUPHENAZINE HYDROCHLORIDE 10 MG: 5 TABLET, FILM COATED ORAL at 08:37

## 2021-07-18 RX ADMIN — LORAZEPAM 1.5 MG: 1 TABLET ORAL at 20:22

## 2021-07-18 RX ADMIN — BENZTROPINE MESYLATE 0.5 MG: 0.5 TABLET ORAL at 20:22

## 2021-07-18 RX ADMIN — DIVALPROEX SODIUM 1500 MG: 500 TABLET, FILM COATED, EXTENDED RELEASE ORAL at 20:21

## 2021-07-18 RX ADMIN — FLUPHENAZINE HYDROCHLORIDE 10 MG: 5 TABLET, FILM COATED ORAL at 20:21

## 2021-07-18 RX ADMIN — BENZTROPINE MESYLATE 0.5 MG: 0.5 TABLET ORAL at 08:37

## 2021-07-18 RX ADMIN — LEVOTHYROXINE SODIUM 50 MCG: 0.05 TABLET ORAL at 06:17

## 2021-07-18 RX ADMIN — LITHIUM CARBONATE 900 MG: 450 TABLET, EXTENDED RELEASE ORAL at 20:22

## 2021-07-18 RX ADMIN — Medication 50 MCG: at 08:38

## 2021-07-18 RX ADMIN — MULTIPLE VITAMINS W/ MINERALS TAB 1 TABLET: TAB at 17:19

## 2021-07-18 RX ADMIN — OLANZAPINE 10 MG: 10 TABLET, FILM COATED ORAL at 12:17

## 2021-07-18 ASSESSMENT — ACTIVITIES OF DAILY LIVING (ADL)
HYGIENE/GROOMING: INDEPENDENT
ORAL_HYGIENE: INDEPENDENT
DRESS: INDEPENDENT

## 2021-07-18 ASSESSMENT — MIFFLIN-ST. JEOR: SCORE: 1483.67

## 2021-07-18 NOTE — PLAN OF CARE
"SHIFT SUMMARY:    Denies pain, SI, HI, AH, VH, anxiety and depression. At the start of shift, she was rambling about \"don't give kids the wrong meds\" repetitively; Giggling and laughing.  Cooperative, taking her medications.  Requested, and was given pages to color. Can be loud at times, but otherwise respecting people's boundaries. At 1217, given zyprexa 10 mg PO for becca - laughing loudly, at times, hysterically. She was asked to go back to her room with staff, to which she replied \"'that's a good idea\". Covid swab performed.  Resting with eyes closed at 1330.  End of shift report given to oncoming nurse.    Problem: Behavioral Health Plan of Care  Goal: Patient-Specific Goal (Individualization)  Description: Pt will sleep 6-8 hours per night.   Pt will eat 50% of meals.   Pt will attend 50% of groups when on open unit when able.  Pt will comply with nursing assessment as needed.   Pt will accept PRN medications when offered.    7/17 - Pt able to wean from MHICU., with appropriate behaviors and cooperate with safety checks upon returning to MHICU. May attend 50% of group therapy with appropriate behaviors.   Outcome: Improving     Problem: Thought Process Alteration  Goal: Optimal Thought Clarity  Description: Pt will have a linear reality based conversation by discharge.   Outcome: No Change     Problem: Suicidal Behavior  Goal: Suicidal Behavior is Absent or Managed  Outcome: Improving     "

## 2021-07-18 NOTE — PLAN OF CARE
Face to face shift report received from Eri GREEN RN. Rounding completed, pt observed in room appears to be sleeping, in no apparent distress, respirations are even and non labored. Pt remains in MHICU for decreased stimulation.        Problem: Behavioral Health Plan of Care  Goal: Patient-Specific Goal (Individualization)  Description: Pt will sleep 6-8 hours per night.   Pt will eat 50% of meals.   Pt will attend 50% of groups when on open unit when able.  Pt will comply with nursing assessment as needed.   Pt will accept PRN medications when offered.    7/17 - Pt able to wean from MHICU., with appropriate behaviors and cooperate with safety checks upon returning to MHICU. May attend 50% of group therapy with appropriate behaviors.   Outcome: Improving      Pt was up and down all night.  Slept approx. 3 hours during the night.      Face to face report will be communicated to oncoming RN.    Danisha Torres RN  7/18/2021  5:43 AM

## 2021-07-18 NOTE — PLAN OF CARE
Problem: Behavioral Health Plan of Care  Goal: Patient-Specific Goal (Individualization)  Description: Pt will sleep 6-8 hours per night.   Pt will eat 50% of meals.   Pt will attend 50% of groups when on open unit when able.  Pt will comply with nursing assessment as needed.   Pt will accept PRN medications when offered.    7/17 - Pt able to wean from MHICU., with appropriate behaviors and cooperate with safety checks upon returning to MHICU. May attend 50% of group therapy with appropriate behaviors.   Outcome: No Change  Note:        Problem: Thought Process Alteration  Goal: Optimal Thought Clarity  Description: Pt will have a linear reality based conversation by discharge.   Outcome: No Change     Problem: Suicidal Behavior  Goal: Suicidal Behavior is Absent or Managed  Outcome: Improving   Pt. Has been up and weaning to open unit, appropriate behaviors so far this shift, slept 3.5 hours last night, attending most group therapy sessions today, denies suicidal ideation, is able to make needs be known, taking prescribed medications, cooperative with redirection when needed, speech is rapid and rambling at times.    Face to face end of shift report will be communicated to oncoming night shift RN.     Eri Clifford RN  7/18/2021  6:24 PM

## 2021-07-18 NOTE — PROGRESS NOTES
" [facility]  Psychiatric Progress Note      Impression:    This is a 45 year old yo female with a history of bipolar disorder and multiple hospitalizations.    I was able to speak to pt this am for several minutes with her answering questions. Pt was laying cards out on the table, I asked what she was playing, her answer \"cards- I like to lay them out like this on the table and see what the story is they are trying to tell me\". I asked what the cards were saying as we were looking at them- \"they're not tarot cards\". She remains disorganized but not to the degree she had been at the time of admit. Is out in the dayroom most of the shift. Sleep reported at 3 hours last night. Will get labs to monitor where her levels are at.     Educated regarding medication indications, risks, benefits, side effects, contraindications and possible interactions. Verbally expressed understanding.        Diagnoses:   Bipolar 1 disorder, current episode manic      Attestation:  Patient has been seen and evaluated by me,  PHILL Palacios CNP          Interim History:   The patient's care was discussed with the treatment team and chart notes were reviewed.          Medications:     Current Facility-Administered Medications Ordered in Epic   Medication Dose Route Frequency Last Rate Last Admin     acetaminophen (TYLENOL) tablet 650 mg  650 mg Oral Q4H PRN   650 mg at 07/11/21 0959     alum & mag hydroxide-simethicone (MAALOX) suspension 30 mL  30 mL Oral Q4H PRN         benztropine (COGENTIN) tablet 1 mg  1 mg Oral BID PRN         cloNIDine (CATAPRES) tablet 0.1 mg  0.1 mg Oral BID PRN   0.1 mg at 07/11/21 1746     diphenhydrAMINE (BENADRYL) injection 25-50 mg  25-50 mg Intramuscular Q6H PRN        Or     diphenhydrAMINE (BENADRYL) capsule 25-50 mg  25-50 mg Oral Q6H PRN   50 mg at 07/12/21 0832     divalproex sodium extended-release (DEPAKOTE ER) 24 hr tablet 750 mg  750 mg Oral At Bedtime         fluPHENAZine (PROLIXIN) " "half-tab 0.5 mg  0.5 mg Oral BID         haloperidol lactate (HALDOL) injection 5-10 mg  5-10 mg Intramuscular Q6H PRN        Or     haloperidol (HALDOL) tablet 5-10 mg  5-10 mg Oral Q6H PRN   10 mg at 07/12/21 0832     hydrOXYzine (ATARAX) tablet 25-50 mg  25-50 mg Oral Q4H PRN         levothyroxine (SYNTHROID/LEVOTHROID) tablet 50 mcg  50 mcg Oral QAM AC   50 mcg at 07/12/21 0643     lithium (ESKALITH CR/LITHOBID) CR tablet 450 mg  450 mg Oral At Bedtime         LORazepam (ATIVAN) tablet 1-2 mg  1-2 mg Oral Q6H PRN   2 mg at 07/12/21 0832    Or     LORazepam (ATIVAN) injection 1-2 mg  1-2 mg Intramuscular Q6H PRN         melatonin tablet 3 mg  3 mg Oral At Bedtime PRN   3 mg at 07/11/21 2359     multivitamin w/minerals (THERA-VIT-M) tablet 1 tablet  1 tablet Oral Daily         nicotine (NICORETTE) gum 2 mg  2 mg Buccal Q1H PRN         OLANZapine (zyPREXA) tablet 5-10 mg  5-10 mg Oral TID PRN        Or     OLANZapine (zyPREXA) injection 5-10 mg  5-10 mg Intramuscular TID PRN   10 mg at 07/11/21 0918     senna-docusate (SENOKOT-S/PERICOLACE) 8.6-50 MG per tablet 1 tablet  1 tablet Oral BID PRN         Vitamin D3 (CHOLECALCIFEROL) tablet 50 mcg  50 mcg Oral Daily   50 mcg at 07/12/21 0832     No current Epic-ordered outpatient medications on file.            10 point ROS- denies acute concerns       Allergies:     Allergies   Allergen Reactions     Shellfish-Derived Products Rash            Psychiatric Examination:   /80   Pulse 92   Temp 99.1  F (37.3  C) (Temporal)   Resp 16   Ht 1.626 m (5' 4\")   Wt 85.4 kg (188 lb 3.2 oz)   SpO2 100%   BMI 32.30 kg/m    Weight is 188 lbs 3.2 oz  Body mass index is 32.3 kg/m .    Appearance:  awake, alert, dressed in hospital scrubs, appeared as age stated and unkempt  Attitude: cooperative but dismissive   Eye Contact:  fair  Mood: still labile at times  Affect:  mood congruent  Speech:  clear, coherent, pressured at times  Psychomotor Behavior:  no evidence of " tardive dyskinesia, dystonia, or tics  Thought Process:  illogical and tangental  Associations:  loosening of associations present  Thought Content:  Denies suicidal thoughts, denies hallucinations, appears preoccupied, responding to auditory hallucinations  Insight:  limited  Judgment:  limited  Oriented to: person, place  Attention Span and Concentration:  limited  Recent and Remote Memory:  fair  Fund of Knowledge: difficult to assess  Muscle Strength and Tone: normal  Gait and Station: Normal           Labs:     No results found for this or any previous visit (from the past 24 hour(s)).    BEHAVIORAL TEAM DISCUSSION    Progress: Is having some improvement. Able to come out to mhicu    Continued Stay Criteria/Rationale: continued paranoia and delusional thoughts. Restart on medications. Very labile    Medical/Physical: denies acute concerns  Precautions:   Falls precaution?: No  Behavioral Orders   Procedures     Code 1 - Restrict to Unit     Routine Programming     As clinically indicated     Status 15     Every 15 minutes.       Plan:     Continue lithium 900 mg at hs    Continue Depakote      Utilize PRN olanzapine when needed      Continue prolixin      Obtain lithium level, VPA level in am    Participants: Sherry Petty CNP, Nursing

## 2021-07-19 LAB
LITHIUM SERPL-SCNC: 0.5 MMOL/L
VALPROATE SERPL-MCNC: 74 MG/L

## 2021-07-19 PROCEDURE — 250N000013 HC RX MED GY IP 250 OP 250 PS 637: Performed by: NURSE PRACTITIONER

## 2021-07-19 PROCEDURE — 80164 ASSAY DIPROPYLACETIC ACD TOT: CPT | Performed by: NURSE PRACTITIONER

## 2021-07-19 PROCEDURE — 124N000001 HC R&B MH

## 2021-07-19 PROCEDURE — 80178 ASSAY OF LITHIUM: CPT | Performed by: NURSE PRACTITIONER

## 2021-07-19 PROCEDURE — 36415 COLL VENOUS BLD VENIPUNCTURE: CPT | Performed by: NURSE PRACTITIONER

## 2021-07-19 PROCEDURE — 250N000013 HC RX MED GY IP 250 OP 250 PS 637: Performed by: PSYCHIATRY & NEUROLOGY

## 2021-07-19 RX ADMIN — LEVOTHYROXINE SODIUM 50 MCG: 0.05 TABLET ORAL at 06:05

## 2021-07-19 RX ADMIN — Medication 50 MCG: at 08:10

## 2021-07-19 RX ADMIN — BENZTROPINE MESYLATE 0.5 MG: 0.5 TABLET ORAL at 21:11

## 2021-07-19 RX ADMIN — BENZTROPINE MESYLATE 0.5 MG: 0.5 TABLET ORAL at 08:10

## 2021-07-19 RX ADMIN — MULTIPLE VITAMINS W/ MINERALS TAB 1 TABLET: TAB at 17:52

## 2021-07-19 RX ADMIN — DIPHENHYDRAMINE HYDROCHLORIDE 50 MG: 25 CAPSULE ORAL at 00:40

## 2021-07-19 RX ADMIN — CLONIDINE HYDROCHLORIDE 0.1 MG: 0.1 TABLET ORAL at 08:10

## 2021-07-19 RX ADMIN — OLANZAPINE 10 MG: 10 TABLET, FILM COATED ORAL at 00:40

## 2021-07-19 RX ADMIN — FLUPHENAZINE HYDROCHLORIDE 10 MG: 5 TABLET, FILM COATED ORAL at 08:10

## 2021-07-19 RX ADMIN — FLUPHENAZINE HYDROCHLORIDE 10 MG: 5 TABLET, FILM COATED ORAL at 21:11

## 2021-07-19 RX ADMIN — LITHIUM CARBONATE 900 MG: 450 TABLET, EXTENDED RELEASE ORAL at 21:10

## 2021-07-19 RX ADMIN — LORAZEPAM 1.5 MG: 1 TABLET ORAL at 06:05

## 2021-07-19 RX ADMIN — LORAZEPAM 1.5 MG: 1 TABLET ORAL at 13:36

## 2021-07-19 RX ADMIN — LORAZEPAM 1.5 MG: 1 TABLET ORAL at 21:10

## 2021-07-19 RX ADMIN — DIVALPROEX SODIUM 1500 MG: 500 TABLET, FILM COATED, EXTENDED RELEASE ORAL at 21:10

## 2021-07-19 ASSESSMENT — ACTIVITIES OF DAILY LIVING (ADL)
DRESS: INDEPENDENT
ORAL_HYGIENE: INDEPENDENT
HYGIENE/GROOMING: INDEPENDENT

## 2021-07-19 NOTE — PLAN OF CARE
"Problem: Behavioral Health Plan of Care  Goal: Patient-Specific Goal (Individualization)  Description: Pt will sleep 6-8 hours per night.   Pt will eat 50% of meals.   Pt will attend 50% of groups when on open unit when able.  Pt will comply with nursing assessment as needed.   Pt will accept PRN medications when offered.  7/17 - Pt able to wean from MHICU., with appropriate behaviors and cooperate with safety checks upon returning to MHICU. May attend 50% of group therapy with appropriate behaviors.   Outcome: Improving  She remains in the MHICU today for decreased stimulation. She denies any issues with appetite or sleep although she doesn't sleep much. She is noted to make multiple requests. She talks of \"meeting with God\", she is noted to sing loudly at times. She is distractible and noted to be responding to internal stimuli.     Problem: Thought Process Alteration  Goal: Optimal Thought Clarity  Description: Pt will have a linear reality based conversation by discharge.   Outcome: Improving  She is not able to manage a reality based conversation. She is tangential. He is noted to respond to internal stimuli.     Face to face end of shift report to be communicated to evening shift RN.     Robert Cortez RN  7/19/2021  2:26 PM           "

## 2021-07-19 NOTE — PLAN OF CARE
Face to face shift report received from Eri GREEN RN. Rounding completed, pt observed in room singing loudly.  Writer offered zyprex and benadryl which she accepted at 0040.  Pt was awake most of the night, sleeping off and on.       Problem: Behavioral Health Plan of Care  Goal: Patient-Specific Goal (Individualization)  Description: Pt will sleep 6-8 hours per night.   Pt will eat 50% of meals.   Pt will attend 50% of groups when on open unit when able.  Pt will comply with nursing assessment as needed.   Pt will accept PRN medications when offered.    7/17 - Pt able to wean from MHICU., with appropriate behaviors and cooperate with safety checks upon returning to MHICU. May attend 50% of group therapy with appropriate behaviors.   Outcome: Improving     Pt slept approx. 4 hours during the night.  Awake this AM Singing very Loudly, pacing in the MHICU , and making multiple requests from staff.      Face to face report will be communicated to oncoming RN.    Danisha Torres RN  7/19/2021  6:47 AM

## 2021-07-19 NOTE — PROGRESS NOTES
BEHAVIORAL TEAM DISCUSSION    Participants: social work, April jerrell, OT, nursing  Progress: some though limited. Slept 4 hours last night.   Continued Stay Criteria/Rationale: very disorganized and delusional  Medical/Physical: none  Precautions: none  Falls precaution?: No  Behavioral Orders   Procedures     Code 1 - Restrict to Unit     Routine Programming     As clinically indicated     Status 15     Every 15 minutes.     Plan:needs time to stabilize on medications. Lithium and depakote are both therapeutic doses.   Rationale for change in precautions or plan: still quite disorganized and delusional.has court appt exam tomorrow.

## 2021-07-19 NOTE — PLAN OF CARE
Dr. Huerta called and is going to do his Examination with the patient over Ipad @ 3:45 pm. SW updated Nursing staff.

## 2021-07-19 NOTE — PLAN OF CARE
Problem: Behavioral Health Plan of Care  Goal: Patient-Specific Goal (Individualization)  Description: Pt will sleep 6-8 hours per night.   Pt will eat 50% of meals.   Pt will attend 50% of groups when on open unit when able.  Pt will comply with nursing assessment as needed.   Pt will accept PRN medications when offered.    7/19 - Pt able to wean from MHICU., with appropriate behaviors and cooperate with safety checks upon returning to MHICU. May attend 50% of group therapy with appropriate behaviors.   Outcome: Improving  Note:     A&O, VSS, denies pain. Denies all criteria including: SI, SIB, HI or hallucinations--appears responding to internal stimuli at times.   Full range affect with pleasant demeanor this evening.  Speech is tangential,  pressured and loud at times.   Cooperative and redirectable.   Independent examiner screening completed via video conference.  Attends and participates in groups.        Problem: Thought Process Alteration  Goal: Optimal Thought Clarity  Description: Pt will have a linear reality based conversation by discharge.   Outcome: Improving     Problem: Suicidal Behavior  Goal: Suicidal Behavior is Absent or Managed  Outcome: Improving     Face to face end of shift report communicated to oncoming shift.     Philly Proctor RN  7/19/2021  6:41 PM

## 2021-07-20 PROCEDURE — 250N000013 HC RX MED GY IP 250 OP 250 PS 637: Performed by: NURSE PRACTITIONER

## 2021-07-20 PROCEDURE — 99233 SBSQ HOSP IP/OBS HIGH 50: CPT | Performed by: NURSE PRACTITIONER

## 2021-07-20 PROCEDURE — 124N000001 HC R&B MH

## 2021-07-20 RX ORDER — LORAZEPAM 1 MG/1
1 TABLET ORAL 3 TIMES DAILY
Status: DISCONTINUED | OUTPATIENT
Start: 2021-07-20 | End: 2021-07-22

## 2021-07-20 RX ADMIN — Medication 50 MCG: at 08:55

## 2021-07-20 RX ADMIN — BENZTROPINE MESYLATE 0.5 MG: 0.5 TABLET ORAL at 08:56

## 2021-07-20 RX ADMIN — LEVOTHYROXINE SODIUM 50 MCG: 0.05 TABLET ORAL at 06:08

## 2021-07-20 RX ADMIN — LORAZEPAM 1.5 MG: 1 TABLET ORAL at 06:08

## 2021-07-20 RX ADMIN — LORAZEPAM 1.5 MG: 1 TABLET ORAL at 13:36

## 2021-07-20 RX ADMIN — FLUPHENAZINE HYDROCHLORIDE 10 MG: 5 TABLET, FILM COATED ORAL at 08:55

## 2021-07-20 RX ADMIN — FLUPHENAZINE HYDROCHLORIDE 10 MG: 5 TABLET, FILM COATED ORAL at 21:02

## 2021-07-20 RX ADMIN — LITHIUM CARBONATE 900 MG: 450 TABLET, EXTENDED RELEASE ORAL at 21:02

## 2021-07-20 RX ADMIN — BENZTROPINE MESYLATE 0.5 MG: 0.5 TABLET ORAL at 21:02

## 2021-07-20 RX ADMIN — HYDROXYZINE HYDROCHLORIDE 50 MG: 25 TABLET, FILM COATED ORAL at 07:28

## 2021-07-20 RX ADMIN — MULTIPLE VITAMINS W/ MINERALS TAB 1 TABLET: TAB at 17:12

## 2021-07-20 RX ADMIN — OLANZAPINE 10 MG: 10 TABLET, FILM COATED ORAL at 07:28

## 2021-07-20 RX ADMIN — LORAZEPAM 1 MG: 1 TABLET ORAL at 21:02

## 2021-07-20 RX ADMIN — DIVALPROEX SODIUM 1500 MG: 500 TABLET, FILM COATED, EXTENDED RELEASE ORAL at 21:02

## 2021-07-20 ASSESSMENT — ACTIVITIES OF DAILY LIVING (ADL)
ORAL_HYGIENE: INDEPENDENT
HYGIENE/GROOMING: INDEPENDENT
DRESS: SCRUBS (BEHAVIORAL HEALTH)

## 2021-07-20 NOTE — PROGRESS NOTES
" [facility]  Psychiatric Progress Note      Impression:    This is a 45 year old yo female with a history of bipolar disorder and multiple hospitalizations.    Rohith is out of mental health intensive care unit today.  Set with her at one of the tables and she appeared to be much calmer than when the last time I had met with her.  She tells me she is \"starting to feel the medications\".  She then tells me \"there are times where I do not sleep for days and now I am sleeping\" \"she did sleep 6 and half hours last night.  The Depakote is likely taking more therapeutic effect.  Both the Depakote and lithium are therapeutic will take time.  I do not want to increase her Prolixin at this time as I did just increase to 10 mg twice a day 3 to 4 days ago.  She does not appear to have any dystonia or akathisia.  She is resting comfortably.  She still has delusions and she tells me that she has created the art work that the other patients are coloring.  The art work that she showed me was actually printed off.  She is grandiose though not as elated.  A bit less labile.  Last night seen her she was sobbing uncontrollably and then later on in the day it would be laughing.    Educated regarding medication indications, risks, benefits, side effects, contraindications and possible interactions. Verbally expressed understanding.        Diagnoses:   Bipolar 1 disorder, current episode manic      Attestation:  Patient has been seen and evaluated by me,  Shy Chance NP          Interim History:   The patient's care was discussed with the treatment team and chart notes were reviewed.          Medications:     Current Facility-Administered Medications Ordered in Epic   Medication Dose Route Frequency Last Rate Last Admin     acetaminophen (TYLENOL) tablet 650 mg  650 mg Oral Q4H PRN   650 mg at 07/11/21 0959     alum & mag hydroxide-simethicone (MAALOX) suspension 30 mL  30 mL Oral Q4H PRN         benztropine " (COGENTIN) tablet 1 mg  1 mg Oral BID PRN         cloNIDine (CATAPRES) tablet 0.1 mg  0.1 mg Oral BID PRN   0.1 mg at 07/11/21 1746     diphenhydrAMINE (BENADRYL) injection 25-50 mg  25-50 mg Intramuscular Q6H PRN        Or     diphenhydrAMINE (BENADRYL) capsule 25-50 mg  25-50 mg Oral Q6H PRN   50 mg at 07/12/21 0832     divalproex sodium extended-release (DEPAKOTE ER) 24 hr tablet 750 mg  750 mg Oral At Bedtime         fluPHENAZine (PROLIXIN) half-tab 0.5 mg  0.5 mg Oral BID         haloperidol lactate (HALDOL) injection 5-10 mg  5-10 mg Intramuscular Q6H PRN        Or     haloperidol (HALDOL) tablet 5-10 mg  5-10 mg Oral Q6H PRN   10 mg at 07/12/21 0832     hydrOXYzine (ATARAX) tablet 25-50 mg  25-50 mg Oral Q4H PRN         levothyroxine (SYNTHROID/LEVOTHROID) tablet 50 mcg  50 mcg Oral QAM AC   50 mcg at 07/12/21 0643     lithium (ESKALITH CR/LITHOBID) CR tablet 450 mg  450 mg Oral At Bedtime         LORazepam (ATIVAN) tablet 1-2 mg  1-2 mg Oral Q6H PRN   2 mg at 07/12/21 0832    Or     LORazepam (ATIVAN) injection 1-2 mg  1-2 mg Intramuscular Q6H PRN         melatonin tablet 3 mg  3 mg Oral At Bedtime PRN   3 mg at 07/11/21 2499     multivitamin w/minerals (THERA-VIT-M) tablet 1 tablet  1 tablet Oral Daily         nicotine (NICORETTE) gum 2 mg  2 mg Buccal Q1H PRN         OLANZapine (zyPREXA) tablet 5-10 mg  5-10 mg Oral TID PRN        Or     OLANZapine (zyPREXA) injection 5-10 mg  5-10 mg Intramuscular TID PRN   10 mg at 07/11/21 0918     senna-docusate (SENOKOT-S/PERICOLACE) 8.6-50 MG per tablet 1 tablet  1 tablet Oral BID PRN         Vitamin D3 (CHOLECALCIFEROL) tablet 50 mcg  50 mcg Oral Daily   50 mcg at 07/12/21 0832     No current Epic-ordered outpatient medications on file.            10 point ROS- denies acute concerns       Allergies:     Allergies   Allergen Reactions     Shellfish-Derived Products Rash            Psychiatric Examination:   /65   Pulse 85   Temp 98.2  F (36.8  C) (Temporal)   " Resp 18   Ht 1.626 m (5' 4\")   Wt 85.4 kg (188 lb 3.2 oz)   SpO2 98%   BMI 32.30 kg/m    Weight is 188 lbs 3.2 oz  Body mass index is 32.3 kg/m .    Appearance:  awake, alert, dressed in hospital scrubs, appeared as age stated and unkempt  Attitude: cooperative but dismissive   Eye Contact:  fair  Mood: still labile at times  Affect:  mood congruent  Speech:  clear, coherent, pressured at times  Psychomotor Behavior:  no evidence of tardive dyskinesia, dystonia, or tics  Thought Process:  illogical and tangental  Associations:  loosening of associations present  Thought Content:  Denies suicidal thoughts, denies hallucinations, appears preoccupied, responding to auditory hallucinations  Insight:  limited  Judgment:  limited  Oriented to: person, place  Attention Span and Concentration:  limited  Recent and Remote Memory:  fair  Fund of Knowledge: difficult to assess  Muscle Strength and Tone: normal  Gait and Station: Normal           Labs:     No results found for this or any previous visit (from the past 24 hour(s)).    BEHAVIORAL TEAM DISCUSSION    Progress: Is having some improvement. Able to come out  mhicu.  Remains grandiose though lability appears to be a bit less.  Still has delusions.    Continued Stay Criteria/Rationale: continued paranoia and delusional thoughts. Restart on medications. Very labile    Medical/Physical: denies acute concerns  Precautions:   Falls precaution?: No  Behavioral Orders   Procedures     Code 1 - Restrict to Unit     Routine Programming     As clinically indicated     Status 15     Every 15 minutes.       Plan:     Decrease ativan and plan to taper. Was being used for agitation.     Continue lithium 900 mg at hs    Continue Depakote      Utilize PRN olanzapine when needed      Continue prolixin 10 mg twice a day.  I do not want to increase the dose further yet at this time.  Depakote and lithium likely need more time to reach efficacy           Participants: April " Robson CNP, Nursing

## 2021-07-20 NOTE — PLAN OF CARE
Problem: Behavioral Health Plan of Care  Goal: Patient-Specific Goal (Individualization)  Description: Pt will sleep 6-8 hours per night.   Pt will eat 50% of meals.   Pt will attend 50% of groups when on open unit when able.  Pt will comply with nursing assessment as needed.   Pt will accept PRN medications when offered.    Pt able to wean from MHICU for limited times if she can maintain appropriate behaviors and cooperate with safety checks upon returning to MHICU. She does better with lower stimulating environments.  Outcome: Improving  Note:   A&O, VSS, denies pain. Denies all criteria including: SI, SIB, HI or hallucinations-appears responding at times.   Full range affect, excitable-loud volume at times.  Redirectable and able to control volume and intrusiveness with peers when reminded.   pleasant demeanor. Continues to make delusional statements to this writer about age and Buddhist.   Attending and participating in groups.        Problem: Thought Process Alteration  Goal: Optimal Thought Clarity  Description: Pt will have a linear reality based conversation by discharge.   Outcome: Improving     Problem: Suicidal Behavior  Goal: Suicidal Behavior is Absent or Managed  Outcome: Improving     Face to face end of shift report communicated to oncoming shift.     Philly Proctor RN  7/20/2021  10:54 PM
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
## 2021-07-20 NOTE — PLAN OF CARE
"    MÓNICA emailed Artem Liivone and explained that the Treatment Team's plan at this point is for patient to transfer to a [facility]. Mr. Contreras faxed the updated notes and the Treatment Teams recommendations to the . Mr. Contreras email stated:     \"I ll forward the provider s position to our  s office - My guess is he s going to want the doctor or someone to provide testimony at the Hearing on Friday but I ll keep you posted.  The  may contact you directly to make arrangements.  Let me know if anything changes on your end.\"       "

## 2021-07-20 NOTE — PLAN OF CARE
Face to face shift report received from Philly FRENCH RN. Rounding completed, pt observed in room appears to be sleeping, in no apparent distress, respirations are even and non labored.  15 min/prn safety checks continue.      Problem: Behavioral Health Plan of Care  Goal: Patient-Specific Goal (Individualization)  Description: Pt will sleep 6-8 hours per night.   Pt will eat 50% of meals.   Pt will attend 50% of groups when on open unit when able.  Pt will comply with nursing assessment as needed.   Pt will accept PRN medications when offered.    7/17 - Pt able to wean from MHICU., with appropriate behaviors and cooperate with safety checks upon returning to MHICU. May attend 50% of group therapy with appropriate behaviors.   Outcome: Improving   Pt slept approx. 6.5 hours during the night.      Face to face report will be communicated to oncoming RN.    Danisha Torres RN  7/20/2021  5:55 AM

## 2021-07-20 NOTE — PLAN OF CARE
"Problem: Behavioral Health Plan of Care  Goal: Patient-Specific Goal (Individualization)  Description: Pt will sleep 6-8 hours per night.   Pt will eat 50% of meals.   Pt will attend 50% of groups when on open unit when able.  Pt will comply with nursing assessment as needed.   Pt will accept PRN medications when offered.  Pt able to wean from MHICU., with appropriate behaviors and cooperate with safety checks upon returning to MHICU.   Outcome: Improving  She is up on the unit this morning. She continues to be hyper verbal and tangential in conversation. She is reminded of appropriate behavior on the open unit. She is agreeable to not sing and not to be intrusive into others conversations while on the open unit. She is sitting in the lounge now visiting with peers appropriately. She reports that she \"had the best sleep ever last night\", \"what ever you guys are doing, keep doing it\".   She continues with some depression and anxiety. She continues with a preoccupation with \"I'm going to leave here and get \", she is grandiose. She is offered and accepts zyprexa 10 mg for becca and hydroxizine 50mg for increased anxiety at 0728.    She was on the open unit from 0730 until 0900 when she was becoming increasingly intrusive and had a loud voice level. She was irritable with returning to the MHICU but did so. Then at 1050 she was brought to the group room as there was only one other person in there. She continues to need decreased stimuli.     1350: she continues to need a lot of redirection when unit is busier. Her voice level is loud. She required firm redirection. She continues with delusional thinking. She says that the Unit assistant is now her  and that this nurse is her .      1500: patient met with the . She said they had a good visit. She returned to the MHICU without issue.    Problem: Thought Process Alteration  Goal: Optimal Thought Clarity  Description: Pt will have a " linear reality based conversation by discharge.   Outcome: Improving  She continues with delusional thinking, auditory hallucinations. She is noted to be responding to auditory hallucinations.    Face to face end of shift report communicated to evening shift RN.     Robert Cortez RN  7/20/2021  3:22 PM

## 2021-07-20 NOTE — PROGRESS NOTES
REGINA PATEL  Patient requested visit by the  for the purpose of prayer.  Regina shared a life review and noted especially her anger at her parents.  She was very emotional with thinking of those to prayer for who needed caring and had an extensive list of concerns.  We closed out time in prayer and this  provided requested Bible and devotional reading materials.

## 2021-07-21 PROCEDURE — 124N000001 HC R&B MH

## 2021-07-21 PROCEDURE — 250N000013 HC RX MED GY IP 250 OP 250 PS 637: Performed by: PSYCHIATRY & NEUROLOGY

## 2021-07-21 PROCEDURE — 250N000013 HC RX MED GY IP 250 OP 250 PS 637: Performed by: NURSE PRACTITIONER

## 2021-07-21 PROCEDURE — 99232 SBSQ HOSP IP/OBS MODERATE 35: CPT | Performed by: NURSE PRACTITIONER

## 2021-07-21 RX ADMIN — FLUPHENAZINE HYDROCHLORIDE 10 MG: 5 TABLET, FILM COATED ORAL at 20:17

## 2021-07-21 RX ADMIN — BENZTROPINE MESYLATE 0.5 MG: 0.5 TABLET ORAL at 20:17

## 2021-07-21 RX ADMIN — FLUPHENAZINE HYDROCHLORIDE 10 MG: 5 TABLET, FILM COATED ORAL at 07:49

## 2021-07-21 RX ADMIN — DIVALPROEX SODIUM 1500 MG: 500 TABLET, FILM COATED, EXTENDED RELEASE ORAL at 20:17

## 2021-07-21 RX ADMIN — LORAZEPAM 1 MG: 1 TABLET ORAL at 13:15

## 2021-07-21 RX ADMIN — LORAZEPAM 1 MG: 1 TABLET ORAL at 20:17

## 2021-07-21 RX ADMIN — Medication 50 MCG: at 07:48

## 2021-07-21 RX ADMIN — LEVOTHYROXINE SODIUM 50 MCG: 0.05 TABLET ORAL at 06:04

## 2021-07-21 RX ADMIN — BENZTROPINE MESYLATE 0.5 MG: 0.5 TABLET ORAL at 07:49

## 2021-07-21 RX ADMIN — CLONIDINE HYDROCHLORIDE 0.1 MG: 0.1 TABLET ORAL at 07:48

## 2021-07-21 RX ADMIN — MULTIPLE VITAMINS W/ MINERALS TAB 1 TABLET: TAB at 17:39

## 2021-07-21 RX ADMIN — LITHIUM CARBONATE 900 MG: 450 TABLET, EXTENDED RELEASE ORAL at 20:17

## 2021-07-21 RX ADMIN — LORAZEPAM 1 MG: 1 TABLET ORAL at 06:04

## 2021-07-21 ASSESSMENT — ACTIVITIES OF DAILY LIVING (ADL)
HYGIENE/GROOMING: INDEPENDENT
ORAL_HYGIENE: INDEPENDENT
ORAL_HYGIENE: INDEPENDENT
DRESS: SCRUBS (BEHAVIORAL HEALTH)
HYGIENE/GROOMING: INDEPENDENT
LAUNDRY: UNABLE TO COMPLETE
DRESS: SCRUBS (BEHAVIORAL HEALTH);INDEPENDENT

## 2021-07-21 NOTE — PLAN OF CARE
"    1:1 time with the patient.  No changes made to the discharge plan at this time.   See the AVS for follow up appointments and recommendations.     SW spoke to patient. Patient handed Sw a hand written note that said, \" Ask him, and he will provide all the details for the the patient's entering the unit, and their past discretions.\"     MÓNICA spoke to patient again. She requested Sw to call her Viktoriae keith at 1:00 pm.     Mónica called and spoke to patient's viktoriae at 1:00 pm. Keith asked question on the court process and what things might take place.   MÓNICA explained the court process to Keith and that the  might give his ruling att he hearing or he might take the matter under advisement. Keith requested an update Friday after the hearing.     Mónica spoke to patient. MÓNICA updated patient that they spoke to Keith and explained the court process to him. Patient said, \" I need you to speak to the Lord to help me with those things I asked you to look into, like my son and where he is now located and what his graciousness wants me to do next.\"  "

## 2021-07-21 NOTE — PLAN OF CARE
Problem: Behavioral Health Plan of Care  Goal: Patient-Specific Goal (Individualization)  Description: Pt will sleep 6-8 hours per night.   Pt will eat 50% of meals.   Pt will attend 50% of groups when on open unit when able.  Pt will comply with nursing assessment as needed.   Pt will accept PRN medications when offered.    Pt able to wean from MHICU for limited times if she can maintain appropriate behaviors and cooperate with safety checks upon returning to MHICU. She does better with lower stimulating environments.  Outcome: Improving  Note: 07:30: Received end of shift report from ANTONIO Raman. Pt up in [facility] area, requesting to come out onto open unit for breakfast, restless activity, somewhat irritable.    14:00:  into visit pt-    14:46: Pt out et about on open unit majority of shift, overall improved psychological status, less intrusive with peers et staff, participates in groups appropriately. Denies SI/HI, hallucinations, in good spirits, working on art project with staff @ present.     Face to face end of shift report to be communicated to on-coming PM staff.     Yesenia Wilkins RN  7/21/2021  2:49 PM             Problem: Behavioral Health Plan of Care  Goal: Adheres to Safety Considerations for Self and Others  Outcome: Improving     Problem: Thought Process Alteration  Goal: Optimal Thought Clarity  Description: Pt will have a linear reality based conversation by discharge.   Outcome: Improving     Problem: Suicidal Behavior  Goal: Suicidal Behavior is Absent or Managed  Outcome: Improving

## 2021-07-21 NOTE — PLAN OF CARE
Face to face shift report received from Philly RIOS RN. Rounding completed, pt observed in bed appears to be sleeping, in no apparent distress. Respirations are even and non labored.        Problem: Behavioral Health Plan of Care  Goal: Patient-Specific Goal (Individualization)  Description: Pt will sleep 6-8 hours per night.   Pt will eat 50% of meals.   Pt will attend 50% of groups when on open unit when able.  Pt will comply with nursing assessment as needed.   Pt will accept PRN medications when offered.    Pt able to wean from MHICU for limited times if she can maintain appropriate behaviors and cooperate with safety checks upon returning to MHICU. She does better with lower stimulating environments.  Outcome: Improving  Pt slept approx. 6 hours during the night.  Offered no complaints.  Will continue to monitor.    Face to face report will be communicated to oncoming RN.    Danisha Torres RN  7/21/2021  5:38 AM

## 2021-07-21 NOTE — PROGRESS NOTES
" [facility]  Psychiatric Progress Note      Impression:    This is a 45 year old yo female with a history of bipolar disorder and multiple hospitalizations.    Rohith is up in the [employer] working on coloring pages when I see her today. She is easily engaged in conversation. \"Ask me anything you want and I'll tell you\". Her mood is much less labile compared to when I last saw her, which was early last week. Notes indicate that her sleep has improved. She has been noted to be less intrusive with peers and is easily redirectable. Denies any hallucinations, denies suicidal thoughts. Voice can get loud at times though does well with reminders to keep voice low. She reports that she is just waiting for court this weekend, is hopeful to be able to return to her apartment, \"but I guess we'll see what the  says\".     Educated regarding medication indications, risks, benefits, side effects, contraindications and possible interactions. Verbally expressed understanding.        Diagnoses:   Bipolar 1 disorder, current episode manic      Attestation:  Patient has been seen and evaluated by me,  Shakira Kilpatrick NP          Interim History:   The patient's care was discussed with the treatment team and chart notes were reviewed.          Medications:     Current Facility-Administered Medications Ordered in Epic   Medication Dose Route Frequency Last Rate Last Admin     acetaminophen (TYLENOL) tablet 650 mg  650 mg Oral Q4H PRN   650 mg at 07/11/21 0959     alum & mag hydroxide-simethicone (MAALOX) suspension 30 mL  30 mL Oral Q4H PRN         benztropine (COGENTIN) tablet 1 mg  1 mg Oral BID PRN         cloNIDine (CATAPRES) tablet 0.1 mg  0.1 mg Oral BID PRN   0.1 mg at 07/11/21 1746     diphenhydrAMINE (BENADRYL) injection 25-50 mg  25-50 mg Intramuscular Q6H PRN        Or     diphenhydrAMINE (BENADRYL) capsule 25-50 mg  25-50 mg Oral Q6H PRN   50 mg at 07/12/21 0832     divalproex sodium extended-release " "(DEPAKOTE ER) 24 hr tablet 750 mg  750 mg Oral At Bedtime         fluPHENAZine (PROLIXIN) half-tab 0.5 mg  0.5 mg Oral BID         haloperidol lactate (HALDOL) injection 5-10 mg  5-10 mg Intramuscular Q6H PRN        Or     haloperidol (HALDOL) tablet 5-10 mg  5-10 mg Oral Q6H PRN   10 mg at 07/12/21 0832     hydrOXYzine (ATARAX) tablet 25-50 mg  25-50 mg Oral Q4H PRN         levothyroxine (SYNTHROID/LEVOTHROID) tablet 50 mcg  50 mcg Oral QAM AC   50 mcg at 07/12/21 0643     lithium (ESKALITH CR/LITHOBID) CR tablet 450 mg  450 mg Oral At Bedtime         LORazepam (ATIVAN) tablet 1-2 mg  1-2 mg Oral Q6H PRN   2 mg at 07/12/21 0832    Or     LORazepam (ATIVAN) injection 1-2 mg  1-2 mg Intramuscular Q6H PRN         melatonin tablet 3 mg  3 mg Oral At Bedtime PRN   3 mg at 07/11/21 2359     multivitamin w/minerals (THERA-VIT-M) tablet 1 tablet  1 tablet Oral Daily         nicotine (NICORETTE) gum 2 mg  2 mg Buccal Q1H PRN         OLANZapine (zyPREXA) tablet 5-10 mg  5-10 mg Oral TID PRN        Or     OLANZapine (zyPREXA) injection 5-10 mg  5-10 mg Intramuscular TID PRN   10 mg at 07/11/21 0918     senna-docusate (SENOKOT-S/PERICOLACE) 8.6-50 MG per tablet 1 tablet  1 tablet Oral BID PRN         Vitamin D3 (CHOLECALCIFEROL) tablet 50 mcg  50 mcg Oral Daily   50 mcg at 07/12/21 0832     No current Epic-ordered outpatient medications on file.            10 point ROS- denies acute concerns       Allergies:     Allergies   Allergen Reactions     Shellfish-Derived Products Rash            Psychiatric Examination:   /86   Pulse 99   Temp 97.5  F (36.4  C) (Temporal)   Resp 18   Ht 1.626 m (5' 4\")   Wt 85.4 kg (188 lb 3.2 oz)   SpO2 99%   BMI 32.30 kg/m    Weight is 188 lbs 3.2 oz  Body mass index is 32.3 kg/m .    Appearance: awake, alert, dressed in hospital scrubs, hair is disheveled  Attitude: cooperative, pleasant  Eye Contact:  fair  Mood: still labile at times though has improved  Affect:  mood " congruent  Speech:  clear, coherent, loud at times  Psychomotor Behavior:  no evidence of tardive dyskinesia, dystonia, or tics  Thought Process: still tangential   Associations: no loosening of associations noted in conversation today  Thought Content:  Denies suicidal thoughts, denies hallucinations, appears preoccupied at times  Insight:  limited  Judgment:  limited  Oriented to: person, place  Attention Span and Concentration:  limited  Recent and Remote Memory:  fair  Fund of Knowledge: difficult to assess  Muscle Strength and Tone: normal  Gait and Station: Normal           Labs:     No results found for this or any previous visit (from the past 24 hour(s)).        BEHAVIORAL TEAM DISCUSSION    Progress: Gradual improvement. Weaning out of MHICU. Mood lability improving though some grandiose thoughts remain. Sleeping well at night.    Continued Stay Criteria/Rationale: monitor for mood lability. Continue to stabilize on medications.    Medical/Physical: denies acute concerns  Precautions:   Falls precaution?: No  Behavioral Orders   Procedures     Code 1 - Restrict to Unit     Routine Programming     As clinically indicated     Status 15     Every 15 minutes.       Plan:     Decrease Ativan to 0.75 mg and plan to taper. Was being used for agitation.   Continue Lithium  mg at bedtime  Continue Depakote ER 1,500 mg at bedtime  Continue Prolixin 10 mg BID  Continue Cogentin 0.5 mg BID         Participants: Shakira Kilpatrick CNP, Nursing, OT, SW

## 2021-07-22 PROCEDURE — 250N000013 HC RX MED GY IP 250 OP 250 PS 637: Performed by: NURSE PRACTITIONER

## 2021-07-22 PROCEDURE — 124N000001 HC R&B MH

## 2021-07-22 PROCEDURE — 99232 SBSQ HOSP IP/OBS MODERATE 35: CPT | Performed by: NURSE PRACTITIONER

## 2021-07-22 RX ADMIN — DIVALPROEX SODIUM 1500 MG: 500 TABLET, FILM COATED, EXTENDED RELEASE ORAL at 20:11

## 2021-07-22 RX ADMIN — LORAZEPAM 1 MG: 1 TABLET ORAL at 05:56

## 2021-07-22 RX ADMIN — LITHIUM CARBONATE 900 MG: 450 TABLET, EXTENDED RELEASE ORAL at 20:11

## 2021-07-22 RX ADMIN — FLUPHENAZINE HYDROCHLORIDE 10 MG: 5 TABLET, FILM COATED ORAL at 08:04

## 2021-07-22 RX ADMIN — LORAZEPAM 0.75 MG: 0.5 TABLET ORAL at 14:37

## 2021-07-22 RX ADMIN — LEVOTHYROXINE SODIUM 50 MCG: 0.05 TABLET ORAL at 05:56

## 2021-07-22 RX ADMIN — Medication 50 MCG: at 08:04

## 2021-07-22 RX ADMIN — BENZTROPINE MESYLATE 0.5 MG: 0.5 TABLET ORAL at 08:04

## 2021-07-22 RX ADMIN — LORAZEPAM 0.75 MG: 0.5 TABLET ORAL at 20:11

## 2021-07-22 RX ADMIN — MULTIPLE VITAMINS W/ MINERALS TAB 1 TABLET: TAB at 17:00

## 2021-07-22 RX ADMIN — BENZTROPINE MESYLATE 0.5 MG: 0.5 TABLET ORAL at 20:11

## 2021-07-22 RX ADMIN — ALUMINUM HYDROXIDE, MAGNESIUM HYDROXIDE, AND SIMETHICONE 30 ML: 200; 200; 20 SUSPENSION ORAL at 20:00

## 2021-07-22 RX ADMIN — FLUPHENAZINE HYDROCHLORIDE 10 MG: 5 TABLET, FILM COATED ORAL at 20:11

## 2021-07-22 ASSESSMENT — ACTIVITIES OF DAILY LIVING (ADL)
DRESS: SCRUBS (BEHAVIORAL HEALTH)
LAUNDRY: UNABLE TO COMPLETE
DRESS: SCRUBS (BEHAVIORAL HEALTH)
LAUNDRY: UNABLE TO COMPLETE
ORAL_HYGIENE: INDEPENDENT
HYGIENE/GROOMING: INDEPENDENT
ORAL_HYGIENE: INDEPENDENT
HYGIENE/GROOMING: INDEPENDENT

## 2021-07-22 NOTE — PLAN OF CARE
"  Problem: Behavioral Health Plan of Care  Goal: Patient-Specific Goal (Individualization)  Description: Pt will sleep 6-8 hours per night.   Pt will eat 50% of meals.   Pt will attend 50% of groups when on open unit when able.  Pt will comply with nursing assessment as needed.   Pt will accept PRN medications when offered.    Pt able to wean from MHICU for limited times if she can maintain appropriate behaviors and cooperate with safety checks upon returning to MHICU. She does better with lower stimulating environments.  Outcome: Improving  Note: Shift Summery:      Face to face end of shift report received from night shift RN. Rounding completed. Pt's speech is pressured and rapid. She states, \"Would you let them know, I would like to go home tomorrow.\" Pt attends group frequently throughout the day. Pt accepts and receives prescribed medications. Pt denies pain, SI, and hallucinations. Pt does not maintain eye contact and stares into space during conversation with this writer. Pt converses frequently with peers in lobby throughout day. Pt maintains appropriate, calm, and cooperative behaviors throughout day. Face to face end of shift report to be given to evening RN.       Problem: Thought Process Alteration  Goal: Optimal Thought Clarity  Description: Pt will have a linear reality based conversation by discharge.   Outcome: Improving     Problem: Suicidal Behavior  Goal: Suicidal Behavior is Absent or Managed  Outcome: Improving     "

## 2021-07-22 NOTE — PROGRESS NOTES
REGINA PATEL  Patient requested visit by the  for the purpose of prayer.  Regina was emphatic with her court hearing tomorrow that she must go back home and be with her fiance.  She gave a litany of experiences with the system that had put her back into residential treatment.  We close our time in prayer.

## 2021-07-22 NOTE — PLAN OF CARE
"    1:1 time with the patient.  No changes made to the discharge plan at this time.   See the AVS for follow up appointments and recommendations.     SW spoke to patient. Patient said, \" God is on my side and will answer all your questions. If the Movie ends the life within will continue through the sequel.\"     SW faxed updated notes to [facility] for court.      MÓNICA received call from José Antonio BENITEZ With the [facility]. He requested for the patient to call him so he could explain the court process further to her and introduce himself.     MÓNICA provided patient with José Antonio TERRAZAS's phone number. Patient attempted to call José Antonio TERRAZAS and she left a message for him.     SW spoke to patient because she requested to speak to them. The patient presents like a young child pretending, but what she pretends is her reality.   The patient described her life and her wants and needs as if she was a director of her own Movie or play.   She insisted that MÓNICA is an Dawit sent down from the [facility] above to be her own personal liaison to communicate all her demands back to him. Patient provided MÓNICA with a 20 page list of demands to give to the [facility]. The Demands consisted who she wanted in her Wedding that included  staff from [facility] and those she met with at [facility]. The demands also listed things that Men and Women want and she believes they desire.   "

## 2021-07-22 NOTE — PLAN OF CARE
"SHIFT SUMMARY:  Denies pain, SI/HI, AH/VH, depression and anxiety.     On the phone with her  at 1550 - tearful afterwards, because she \"has to go to residential, and I want to go home instead\". Brought back to her room in MHICU for approximately 10 minutes. Afterwards, she was calm, then went to group. Requested, and was given, her therapist's phone number. Later, states her  \"lied to me, but that's okay, he's still going to be my \". In her room at 2030 per her request. Report given to oncoming nurse.      Problem: Behavioral Health Plan of Care  Goal: Patient-Specific Goal (Individualization)  Description: Pt will sleep 6-8 hours per night.   Pt will eat 50% of meals.   Pt will attend 50% of groups when on open unit when able.  Pt will comply with nursing assessment as needed.   Pt will accept PRN medications when offered.    Pt able to wean from MHICU for limited times if she can maintain appropriate behaviors and cooperate with safety checks upon returning to MHICU. She does better with lower stimulating environments.  Outcome: Improving     Problem: Thought Process Alteration  Goal: Optimal Thought Clarity  Description: Pt will have a linear reality based conversation by discharge.   Outcome: Improving     Problem: Suicidal Behavior  Goal: Suicidal Behavior is Absent or Managed  Outcome: Improving     "

## 2021-07-22 NOTE — PLAN OF CARE
Face to face shift report received from Philly RIOS RN. Rounding completed, pt observed in room appears to be sleeping, in no apparent distress.  Respirations are even and non labored.  15 min/prn safety checks continue.  Pt remains in MHICU for decreased stimuli.        Problem: Behavioral Health Plan of Care  Goal: Patient-Specific Goal (Individualization)  Description: Pt will sleep 6-8 hours per night.   Pt will eat 50% of meals.   Pt will attend 50% of groups when on open unit when able.  Pt will comply with nursing assessment as needed.   Pt will accept PRN medications when offered.    Pt able to wean from MHICU for limited times if she can maintain appropriate behaviors and cooperate with safety checks upon returning to MHICU. She does better with lower stimulating environments.  Outcome: Improving   Pt slept approx. 6 hours during the night.  Awake this AM, polite, pleasant and cooperative with staff.     Face to face report will be communicated to oncoming RN.    Danisha Torres RN  7/22/2021  6:19 AM

## 2021-07-22 NOTE — PLAN OF CARE
Problem: Behavioral Health Plan of Care  Goal: Patient-Specific Goal (Individualization)  Description: Pt will sleep 6-8 hours per night.   Pt will eat 50% of meals.   Pt will attend 50% of groups when on open unit when able.  Pt will comply with nursing assessment as needed.   Pt will accept PRN medications when offered.    Pt able to wean from MHICU for limited times if she can maintain appropriate behaviors and cooperate with safety checks upon returning to MHICU. She does better with lower stimulating environments.  Outcome: Improving  Note:      VSS, denies pain denies all criteria including: SI, SIB, HI or hallucinations.   restless and easily excitable but very redirectable.  Speech is less pressured, continues with flight of ideas but is able to have short conversations.    Pleasant demeanor. Lets needs be known.   Weans most of shift-interactions appropriate with staff and peers.      Problem: Thought Process Alteration  Goal: Optimal Thought Clarity  Description: Pt will have a linear reality based conversation by discharge.   Outcome: Improving     Problem: Suicidal Behavior  Goal: Suicidal Behavior is Absent or Managed  Outcome: Improving     Face to face end of shift report communicated to oncoming shift.     Philly Proctor RN  7/21/2021  7:35 PM

## 2021-07-23 PROCEDURE — 124N000001 HC R&B MH

## 2021-07-23 PROCEDURE — 250N000013 HC RX MED GY IP 250 OP 250 PS 637: Performed by: NURSE PRACTITIONER

## 2021-07-23 RX ADMIN — BENZTROPINE MESYLATE 0.5 MG: 0.5 TABLET ORAL at 21:04

## 2021-07-23 RX ADMIN — LORAZEPAM 0.75 MG: 0.5 TABLET ORAL at 21:04

## 2021-07-23 RX ADMIN — FLUPHENAZINE HYDROCHLORIDE 10 MG: 5 TABLET, FILM COATED ORAL at 08:15

## 2021-07-23 RX ADMIN — BENZTROPINE MESYLATE 0.5 MG: 0.5 TABLET ORAL at 08:15

## 2021-07-23 RX ADMIN — Medication 50 MCG: at 08:15

## 2021-07-23 RX ADMIN — MULTIPLE VITAMINS W/ MINERALS TAB 1 TABLET: TAB at 16:24

## 2021-07-23 RX ADMIN — LITHIUM CARBONATE 900 MG: 450 TABLET, EXTENDED RELEASE ORAL at 21:04

## 2021-07-23 RX ADMIN — LORAZEPAM 0.75 MG: 0.5 TABLET ORAL at 06:22

## 2021-07-23 RX ADMIN — LEVOTHYROXINE SODIUM 50 MCG: 0.05 TABLET ORAL at 06:22

## 2021-07-23 RX ADMIN — FLUPHENAZINE HYDROCHLORIDE 10 MG: 5 TABLET, FILM COATED ORAL at 21:04

## 2021-07-23 RX ADMIN — DIVALPROEX SODIUM 1500 MG: 500 TABLET, FILM COATED, EXTENDED RELEASE ORAL at 21:04

## 2021-07-23 RX ADMIN — LORAZEPAM 0.75 MG: 0.5 TABLET ORAL at 14:37

## 2021-07-23 RX ADMIN — HYDROXYZINE HYDROCHLORIDE 50 MG: 25 TABLET, FILM COATED ORAL at 08:56

## 2021-07-23 ASSESSMENT — ACTIVITIES OF DAILY LIVING (ADL)
LAUNDRY: UNABLE TO COMPLETE
HYGIENE/GROOMING: INDEPENDENT
ORAL_HYGIENE: INDEPENDENT
DRESS: SCRUBS (BEHAVIORAL HEALTH)

## 2021-07-23 NOTE — PLAN OF CARE
Pt walked into the group room saying that she feels bad for everyone here and she doesn't want to do it, but if she doesn't get to go home tomorrow, she is going to raise hell and this place is gonna pay for it. Pt also states that we will all pay the price. Pt does not state what she means by this, but is very set on that it will be very bad for everyone here.

## 2021-07-23 NOTE — PLAN OF CARE
Face to face shift report received from Rashard AVLAREZ.  Rounding completed, pt observed in room appears to be sleeping, in no apparent distress.  Respirations are even and non labored.  15 min/prn safety checks continue.        Problem: Behavioral Health Plan of Care  Goal: Patient-Specific Goal (Individualization)  Description: Pt will sleep 6-8 hours per night.   Pt will eat 50% of meals.   Pt will attend 50% of groups when on open unit when able.  Pt will comply with nursing assessment as needed.   Pt will accept PRN medications when offered.    Pt able to wean from MHICU for limited times if she can maintain appropriate behaviors and cooperate with safety checks upon returning to MHICU. She does better with lower stimulating environments.  Outcome: Improving     Pt slept approx. 6 hours during the night.  Offered no complaints.      Face to face report will be communicated to oncoming RN.    Danisha Torres RN  7/23/2021  6:18 AM

## 2021-07-23 NOTE — PLAN OF CARE
Face to face received from Danisha FIGUEROA RN.   Rounding completed, pt observed in MH ICU this morning requesting to wean onto open unit.   Pt weaning onto open unit this morning for breakfast.   Pt cooperative with morning medications.   Pt denies pain this morning.       Pt offered PRN medication this morning for anxiety prior to court hearing.   Pt received 50 mg atarax at 08:58.     Pt in attending group session this morning after court hearing.    Pt out weaning onto open unit this afternoon. Pt appropriate with peers and staff this shift.         Problem: Behavioral Health Plan of Care  Goal: Patient-Specific Goal (Individualization)  Description: Pt will sleep 6-8 hours per night.   Pt will eat 50% of meals.   Pt will attend 50% of groups when on open unit when able.  Pt will comply with nursing assessment as needed.   Pt will accept PRN medications when offered.    Pt able to wean from MHICU for limited times if she can maintain appropriate behaviors and cooperate with safety checks upon returning to MHICU. She does better with lower stimulating environments.  Outcome: Improving  Note:        Problem: Thought Process Alteration  Goal: Optimal Thought Clarity  Description: Pt will have a linear reality based conversation by discharge.   Outcome: Improving     Problem: Suicidal Behavior  Goal: Suicidal Behavior is Absent or Managed  Outcome: Improving     Face to face end of shift report to be communicated to oncoming RN     Luiza May RN  7/23/2021  10:16 AM

## 2021-07-23 NOTE — PLAN OF CARE
The patient had her committment hearing this mornining @ 9:10 am.     The  is taking the matter under advisement. The  stated they will provide their discission to the hospital by the end of the day.      Patient provided MÓNICA with a [employer] folder with a list of the demands she is seeking in regards to animal adoption, civil rights, the cost of rent, and vehicle costs to submit on her behalf to the Lord.       [facility] emailed the commitment Court paper work. The patient was granted a Stayed Order of Commitment. SW provided patient a copy of the Stayed Order for commitment paper work and explained it to the patient.

## 2021-07-24 PROCEDURE — 250N000013 HC RX MED GY IP 250 OP 250 PS 637: Performed by: NURSE PRACTITIONER

## 2021-07-24 PROCEDURE — 124N000001 HC R&B MH

## 2021-07-24 PROCEDURE — 99232 SBSQ HOSP IP/OBS MODERATE 35: CPT | Performed by: NURSE PRACTITIONER

## 2021-07-24 RX ORDER — LORAZEPAM 0.5 MG/1
0.5 TABLET ORAL 3 TIMES DAILY
Status: DISCONTINUED | OUTPATIENT
Start: 2021-07-24 | End: 2021-07-27

## 2021-07-24 RX ADMIN — Medication 50 MCG: at 08:14

## 2021-07-24 RX ADMIN — LORAZEPAM 0.75 MG: 0.5 TABLET ORAL at 13:38

## 2021-07-24 RX ADMIN — LEVOTHYROXINE SODIUM 50 MCG: 0.05 TABLET ORAL at 06:05

## 2021-07-24 RX ADMIN — LITHIUM CARBONATE 900 MG: 450 TABLET, EXTENDED RELEASE ORAL at 20:52

## 2021-07-24 RX ADMIN — FLUPHENAZINE HYDROCHLORIDE 10 MG: 5 TABLET, FILM COATED ORAL at 20:52

## 2021-07-24 RX ADMIN — MULTIPLE VITAMINS W/ MINERALS TAB 1 TABLET: TAB at 17:22

## 2021-07-24 RX ADMIN — LORAZEPAM 0.75 MG: 0.5 TABLET ORAL at 06:05

## 2021-07-24 RX ADMIN — LORAZEPAM 0.5 MG: 0.5 TABLET ORAL at 20:53

## 2021-07-24 RX ADMIN — DIVALPROEX SODIUM 1500 MG: 500 TABLET, FILM COATED, EXTENDED RELEASE ORAL at 20:52

## 2021-07-24 RX ADMIN — BENZTROPINE MESYLATE 0.5 MG: 0.5 TABLET ORAL at 20:52

## 2021-07-24 RX ADMIN — ALUMINUM HYDROXIDE, MAGNESIUM HYDROXIDE, AND SIMETHICONE 30 ML: 200; 200; 20 SUSPENSION ORAL at 14:11

## 2021-07-24 RX ADMIN — FLUPHENAZINE HYDROCHLORIDE 10 MG: 5 TABLET, FILM COATED ORAL at 08:14

## 2021-07-24 RX ADMIN — BENZTROPINE MESYLATE 0.5 MG: 0.5 TABLET ORAL at 08:14

## 2021-07-24 NOTE — PROGRESS NOTES
"REGINA PATEL  Patient requested visit with the .  Regina was quite animated today and worked up over issues of following a schedule and keeping her thoughts in order as she believed others wanted.  Occasionally she can get loud and tells me \"you are the only one I can vent these feelings to.\"   We closed our time together in prayer.  She is anxious to get out of the facility on Monday.  "

## 2021-07-24 NOTE — PLAN OF CARE
Problem: Behavioral Health Plan of Care  Goal: Patient-Specific Goal (Individualization)  Description: Pt will sleep 6-8 hours per night.   Pt will eat 50% of meals.   Pt will attend 50% of groups when on open unit when able.  Pt will comply with nursing assessment as needed.   Pt will accept PRN medications when offered.    Pt able to wean from MHICU for limited times if she can maintain appropriate behaviors and cooperate with safety checks upon returning to MHICU. She does better with lower stimulating environments.  Outcome: Improving  Note: Report received from Rashard. [place] complete. Pt observed sleeping in right side lying position with regular and unlabored respirations.    Pt has been in bed with eyes closed and regular respirations. 15 minute and PRN checks all night. No complaints offered. Will continue to monitor.    Pt slept approx 7.5 hours this NOC shift    Face to face end of shift report communicated to oncoming RN.    Eri HADDAD RN  July 24, 2021  3:17 AM     r     Problem: Thought Process Alteration  Goal: Optimal Thought Clarity  Description: Pt will have a linear reality based conversation by discharge.   Outcome: Improving  Note: Unable to assess due to pt sleeping. No issues or concerns noted at this time.       Problem: Suicidal Behavior  Goal: Suicidal Behavior is Absent or Managed  Outcome: Improving  Note: Unable to assess due to pt sleeping. Pt has remained free of self-harm.

## 2021-07-24 NOTE — PLAN OF CARE
Face to face end of shift report communicated to oncoming shift.     Pauline Elizabeth RN  7/24/2021  3:06 PM    Problem: Behavioral Health Plan of Care  Goal: Patient-Specific Goal (Individualization)  Description: Pt will sleep 6-8 hours per night.   Pt will eat 50% of meals.   Pt will attend 50% of groups when on open unit when able.  Pt will comply with nursing assessment as needed.   Pt will accept PRN medications when offered.    Pt able to wean from MHICU for limited times if she can maintain appropriate behaviors and cooperate with safety checks upon returning to MHICU. She does better with lower stimulating environments.  Outcome: No Change  Note: Shift Summery:    Patient reports no difficulties with sleep.  Patient eats greater than 50% of meals.  Patient attends all groups.  Patient cooperative with nursing assessment.  Patient takes all medications as prescribed.  Patient maintains appropriate behaviors with staff and peers.    Patient lets needs be known.    Patient spends this shift weaning.  Back to MHICU at 1430.    PRN:  Maalox given at 1411 for acid reflex.     Face to face start of shift report received from RN.  Patient in MHICU room at this time, will continue to monitor.

## 2021-07-24 NOTE — PROGRESS NOTES
" [facility]  Psychiatric Progress Note      Impression:    This is a 45 year old yo female with a history of bipolar disorder and multiple hospitalizations.    Rohith is resting in bed when I see her today. Observed there to be about 30+ coloring pages taped all over the walls of her room. She immediately asks my name, when given she becomes very quiet and offers very little in further conversation and makes little eye contact. She is likely upset that I had to provide testimony yesterday about her condition, which she was not in agreement with. She states that she is sleeping well, denies hallucinations. Denies any side effects from the medications \"I am pleased with my progress\".     Educated regarding medication indications, risks, benefits, side effects, contraindications and possible interactions. Verbally expressed understanding.        Diagnoses:   Bipolar 1 disorder, current episode manic      Attestation:  Patient has been seen and evaluated by me,  Shakira Kilpatrick NP          Interim History:   The patient's care was discussed with the treatment team and chart notes were reviewed.          Medications:     Current Facility-Administered Medications Ordered in Epic   Medication Dose Route Frequency Last Rate Last Admin     acetaminophen (TYLENOL) tablet 650 mg  650 mg Oral Q4H PRN   650 mg at 07/11/21 0959     alum & mag hydroxide-simethicone (MAALOX) suspension 30 mL  30 mL Oral Q4H PRN         benztropine (COGENTIN) tablet 1 mg  1 mg Oral BID PRN         cloNIDine (CATAPRES) tablet 0.1 mg  0.1 mg Oral BID PRN   0.1 mg at 07/11/21 1746     diphenhydrAMINE (BENADRYL) injection 25-50 mg  25-50 mg Intramuscular Q6H PRN        Or     diphenhydrAMINE (BENADRYL) capsule 25-50 mg  25-50 mg Oral Q6H PRN   50 mg at 07/12/21 0832     divalproex sodium extended-release (DEPAKOTE ER) 24 hr tablet 750 mg  750 mg Oral At Bedtime         fluPHENAZine (PROLIXIN) half-tab 0.5 mg  0.5 mg Oral BID         " "haloperidol lactate (HALDOL) injection 5-10 mg  5-10 mg Intramuscular Q6H PRN        Or     haloperidol (HALDOL) tablet 5-10 mg  5-10 mg Oral Q6H PRN   10 mg at 07/12/21 0832     hydrOXYzine (ATARAX) tablet 25-50 mg  25-50 mg Oral Q4H PRN         levothyroxine (SYNTHROID/LEVOTHROID) tablet 50 mcg  50 mcg Oral QAM AC   50 mcg at 07/12/21 0643     lithium (ESKALITH CR/LITHOBID) CR tablet 450 mg  450 mg Oral At Bedtime         LORazepam (ATIVAN) tablet 1-2 mg  1-2 mg Oral Q6H PRN   2 mg at 07/12/21 0832    Or     LORazepam (ATIVAN) injection 1-2 mg  1-2 mg Intramuscular Q6H PRN         melatonin tablet 3 mg  3 mg Oral At Bedtime PRN   3 mg at 07/11/21 2359     multivitamin w/minerals (THERA-VIT-M) tablet 1 tablet  1 tablet Oral Daily         nicotine (NICORETTE) gum 2 mg  2 mg Buccal Q1H PRN         OLANZapine (zyPREXA) tablet 5-10 mg  5-10 mg Oral TID PRN        Or     OLANZapine (zyPREXA) injection 5-10 mg  5-10 mg Intramuscular TID PRN   10 mg at 07/11/21 0918     senna-docusate (SENOKOT-S/PERICOLACE) 8.6-50 MG per tablet 1 tablet  1 tablet Oral BID PRN         Vitamin D3 (CHOLECALCIFEROL) tablet 50 mcg  50 mcg Oral Daily   50 mcg at 07/12/21 0832     No current [facility]-ordered outpatient medications on file.            10 point ROS- denies acute concerns       Allergies:     Allergies   Allergen Reactions     Shellfish-Derived Products Rash            Psychiatric Examination:   /92   Pulse 82   Temp 96.9  F (36.1  C) (Tympanic)   Resp 14   Ht 1.626 m (5' 4\")   Wt 85.4 kg (188 lb 3.2 oz)   SpO2 100%   BMI 32.30 kg/m    Weight is 188 lbs 3.2 oz  Body mass index is 32.3 kg/m .    Appearance: awake, alert, dressed in hospital scrubs, hair is disheveled  Attitude: cooperative though guarded today  Eye Contact: limited today  Mood: lability has been improving  Affect:  mood congruent  Speech:  clear, coherent, loud and pressured at times  Psychomotor Behavior:  no evidence of tardive dyskinesia, dystonia, or " tics  Thought Process: difficult to assess today as she offers very little spontaneous conversation  Associations: no loosening of associations noted  Thought Content:  Denies suicidal thoughts, denies hallucinations, appears preoccupied at times  Insight:  limited  Judgment:  limited  Oriented to: person, place  Attention Span and Concentration:  limited  Recent and Remote Memory:  fair  Fund of Knowledge: difficult to assess  Muscle Strength and Tone: normal  Gait and Station: Normal           Labs:     No results found for this or any previous visit (from the past 24 hour(s)).        BEHAVIORAL TEAM DISCUSSION    Progress: Gradual improvement. Weaning out of MHICU. Mood lability improving though some grandiose thoughts remain. Sleeping well at night. Tolerating medications, denies side effects.    Continued Stay Criteria/Rationale: monitor for mood lability. Continue to stabilize on medications.    Medical/Physical: denies acute concerns  Precautions:   Falls precaution?: No  Behavioral Orders   Procedures     Code 1 - Restrict to Unit     Routine Programming     As clinically indicated     Status 15     Every 15 minutes.       Plan:     Decrease Ativan to 0.5 mg and plan to continue taper. Was being used for agitation.   Continue Lithium  mg at bedtime  Continue Depakote ER 1,500 mg at bedtime  Continue Prolixin 10 mg BID  Continue Cogentin 0.5 mg BID  Stay of commitment granted         Participants: Shakira Kilpatrick CNP, Nursing

## 2021-07-24 NOTE — PLAN OF CARE
"SHIFT SUMMARY:  Denies pain, SI/HI, AH/VH, depression and anxiety. States she \"wants to go home to be with my  and have a normal life.  I don't want to go to residential\". Can still be intrusive and occasionally loud, but less demanding of staff. Redirectable and cooperative. In her room, sleeping at 2000. Report given to oncoming nurse.    Problem: Behavioral Health Plan of Care  Goal: Patient-Specific Goal (Individualization)  Description: Pt will sleep 6-8 hours per night.   Pt will eat 50% of meals.   Pt will attend 50% of groups when on open unit when able.  Pt will comply with nursing assessment as needed.   Pt will accept PRN medications when offered.    Has been eating well, attending group and taking PRN medications when offered.    Pt able to wean from MHICU for limited times if she can maintain appropriate behaviors and cooperate with safety checks upon returning to MHICU. She does better with lower stimulating environments.    Weaned the majority of this shift.  Outcome: Improving     Problem: Thought Process Alteration  Goal: Optimal Thought Clarity  Description: Pt will have a linear reality based conversation by discharge.   Outcome: Improving    Is able to have increased linear reality based conversations.     Problem: Suicidal Behavior  Goal: Suicidal Behavior is Absent or Managed  Outcome: Improving    Denies SI.     "

## 2021-07-25 PROCEDURE — 250N000013 HC RX MED GY IP 250 OP 250 PS 637: Performed by: NURSE PRACTITIONER

## 2021-07-25 PROCEDURE — 124N000001 HC R&B MH

## 2021-07-25 RX ADMIN — LEVOTHYROXINE SODIUM 50 MCG: 0.05 TABLET ORAL at 06:23

## 2021-07-25 RX ADMIN — ALUMINUM HYDROXIDE, MAGNESIUM HYDROXIDE, AND SIMETHICONE 30 ML: 200; 200; 20 SUSPENSION ORAL at 19:25

## 2021-07-25 RX ADMIN — LITHIUM CARBONATE 900 MG: 450 TABLET, EXTENDED RELEASE ORAL at 21:02

## 2021-07-25 RX ADMIN — BENZTROPINE MESYLATE 1 MG: 1 TABLET ORAL at 19:24

## 2021-07-25 RX ADMIN — FLUPHENAZINE HYDROCHLORIDE 10 MG: 5 TABLET, FILM COATED ORAL at 08:34

## 2021-07-25 RX ADMIN — LORAZEPAM 0.5 MG: 0.5 TABLET ORAL at 06:23

## 2021-07-25 RX ADMIN — LORAZEPAM 0.5 MG: 0.5 TABLET ORAL at 14:54

## 2021-07-25 RX ADMIN — BENZTROPINE MESYLATE 0.5 MG: 0.5 TABLET ORAL at 21:03

## 2021-07-25 RX ADMIN — Medication 50 MCG: at 08:34

## 2021-07-25 RX ADMIN — FLUPHENAZINE HYDROCHLORIDE 10 MG: 5 TABLET, FILM COATED ORAL at 21:03

## 2021-07-25 RX ADMIN — MULTIPLE VITAMINS W/ MINERALS TAB 1 TABLET: TAB at 08:34

## 2021-07-25 RX ADMIN — DIVALPROEX SODIUM 1500 MG: 500 TABLET, FILM COATED, EXTENDED RELEASE ORAL at 21:03

## 2021-07-25 RX ADMIN — BENZTROPINE MESYLATE 0.5 MG: 0.5 TABLET ORAL at 08:34

## 2021-07-25 RX ADMIN — LORAZEPAM 0.5 MG: 0.5 TABLET ORAL at 21:03

## 2021-07-25 NOTE — PROGRESS NOTES
PCS and Staff went into pt room to preform hair hygeine and education on self care with hair. Pt tolerated treatment very well. She seemed to be excited about the care she was receiving from staff. More education and treatment to follow. Personal items and instructions passed onto staff as well as pt. She seems very willing and excited about new hair routine/treatment.

## 2021-07-25 NOTE — PLAN OF CARE
"Face to face end of shift report communicated to oncoming shift.     Pauline Elizabeth RN  7/25/2021  3:11 PM    Problem: Behavioral Health Plan of Care  Goal: Patient-Specific Goal (Individualization)  Description: Pt will sleep 6-8 hours per night.   Pt will eat 50% of meals.   Pt will attend 50% of groups when on open unit when able.  Pt will comply with nursing assessment as needed.   Pt will accept PRN medications when offered.    Pt able to wean from MHICU for limited times if she can maintain appropriate behaviors and cooperate with safety checks upon returning to MHICU. She does better with lower stimulating environments.  Outcome: No Change  Note: Shift Summery:    Patient reports no difficulties with sleep.  Patient weaning to open unit this shift.  Patient's behavior appropriate with staff and peers.  Patient asks to speak with who's in charge.  Patient questioning the unit rules.  \"Well I'm an exception to the rule, I can be out on the unit during report because my behavior is good\"    Patient denies SI, HI, AH and VH.      Questioned patient regarding patient tilting her head to the right side, patient reports \"I used to have ticks so now I do that, it doesn't bother me now\"     1230:  Staff on unit to aid patient in cleansing of hair.     Face to face start of shift report received from RN.  Patient in lounge at this time, will continue to monitor.        "

## 2021-07-25 NOTE — PLAN OF CARE
Face to face end of shift report received from Pauline BENITEZ RN. Rounding completed and patient observed in the MHICU asking to come out which she was allowed to do.     19:30 Update: Patient has been pleasant and cooperative. She sat at table with peers and staff and was able to sit for about an hour and engage in conversation. She spoke when it was appropriate for her to speak and she talked at a normal volume and pace. She did appear to have an increase in the tension in her neck. Her muscles and jaw appear clenched and she looks towards her right shoulder. Patient said she doesn't notice it and it doesn't hurt. She explained that it started after she took Risperdal. These jaw and neck movements appear to be more pronounced. Patient was given 1mg Cogentin at 19:24 to see if it would help. She went to lie down shortly after this and slept the rest of the night.     23:30 Update: Face to face end of shift report communicated to the charge nurse. This writer will continue to provide nursing services for patient throughout the next shift. Rounding completed and patient observed.    06:30 Update: Patient appeared to sleep throughout the night with position changes noted. Patient slept approximately 7 hours. Patient did not request any PRNs and had no complaints of pain. No falls.    Face to face end of shift report communicated to oncoming RN.           Problem: Behavioral Health Plan of Care  Goal: Patient-Specific Goal (Individualization)  Description: Pt will sleep 6-8 hours per night.   Pt will eat 50% of meals.   Pt will attend 50% of groups when on open unit when able.  Pt will comply with nursing assessment as needed.   Pt will accept PRN medications when offered.    Pt able to wean from MHICU for limited times if she can maintain appropriate behaviors and cooperate with safety checks upon returning to MHICU. She does better with lower stimulating environments.  Outcome: No Change     Problem: Thought Process  Alteration  Goal: Optimal Thought Clarity  Description: Pt will have a linear reality based conversation by discharge.   Outcome: No Change

## 2021-07-25 NOTE — PLAN OF CARE
Face to face end of shift report received from Pauline BENITEZ Rounding completed and patient observed in the MHICU asking to come out, which she was allowed to do.     21:00 Update: Patient has been pleasant and cooperative. She is loud and talks quickly much of the time. She denied pain. Patient denied anxiety, depression, HI/SI and hallucinations. She did not attend any groups but did watch TV with peers. She jokes with staff and peers. At times she has to be reminded of appropriate boundaries but she is easily redirectable. Staff approached patient to see if she would be willing to have staff help her get her hair un-matted. She is agreeable. Patient denied needing any PRNs.     23:30 Update: Face to face end of shift report communicated to the charge nurse. This writer will continue to provide nursing services for patient throughout the next shift. Rounding completed and patient observed.    Face to face end of shift report communicated to oncoming RN.      Problem: Behavioral Health Plan of Care  Goal: Patient-Specific Goal (Individualization)  Description: Pt will sleep 6-8 hours per night.   Pt will eat 50% of meals.   Pt will attend 50% of groups when on open unit when able.  Pt will comply with nursing assessment as needed.   Pt will accept PRN medications when offered.    Pt able to wean from MHICU for limited times if she can maintain appropriate behaviors and cooperate with safety checks upon returning to MHICU. She does better with lower stimulating environments.  Outcome: No Change     Problem: Thought Process Alteration  Goal: Optimal Thought Clarity  Description: Pt will have a linear reality based conversation by discharge.   Outcome: No Change

## 2021-07-25 NOTE — PROGRESS NOTES
In pt chart for reviewing prior to hair hygiene attempt. Pending PCS return to the unit before starting.

## 2021-07-26 LAB — SARS-COV-2 RNA RESP QL NAA+PROBE: NEGATIVE

## 2021-07-26 PROCEDURE — U0005 INFEC AGEN DETEC AMPLI PROBE: HCPCS | Performed by: NURSE PRACTITIONER

## 2021-07-26 PROCEDURE — 124N000001 HC R&B MH

## 2021-07-26 PROCEDURE — 250N000013 HC RX MED GY IP 250 OP 250 PS 637: Performed by: NURSE PRACTITIONER

## 2021-07-26 PROCEDURE — 99232 SBSQ HOSP IP/OBS MODERATE 35: CPT | Performed by: NURSE PRACTITIONER

## 2021-07-26 RX ORDER — BENZTROPINE MESYLATE 1 MG/1
1 TABLET ORAL 2 TIMES DAILY
Status: DISCONTINUED | OUTPATIENT
Start: 2021-07-26 | End: 2021-08-03 | Stop reason: HOSPADM

## 2021-07-26 RX ADMIN — LORAZEPAM 0.5 MG: 0.5 TABLET ORAL at 13:03

## 2021-07-26 RX ADMIN — LITHIUM CARBONATE 900 MG: 450 TABLET, EXTENDED RELEASE ORAL at 20:33

## 2021-07-26 RX ADMIN — BENZTROPINE MESYLATE 1 MG: 1 TABLET ORAL at 20:36

## 2021-07-26 RX ADMIN — LORAZEPAM 0.5 MG: 0.5 TABLET ORAL at 06:35

## 2021-07-26 RX ADMIN — FLUPHENAZINE HYDROCHLORIDE 10 MG: 5 TABLET, FILM COATED ORAL at 08:12

## 2021-07-26 RX ADMIN — LEVOTHYROXINE SODIUM 50 MCG: 0.05 TABLET ORAL at 06:35

## 2021-07-26 RX ADMIN — Medication 50 MCG: at 08:12

## 2021-07-26 RX ADMIN — FLUPHENAZINE HYDROCHLORIDE 10 MG: 5 TABLET, FILM COATED ORAL at 20:33

## 2021-07-26 RX ADMIN — DIVALPROEX SODIUM 1500 MG: 500 TABLET, FILM COATED, EXTENDED RELEASE ORAL at 20:33

## 2021-07-26 RX ADMIN — LORAZEPAM 0.5 MG: 0.5 TABLET ORAL at 20:33

## 2021-07-26 RX ADMIN — BENZTROPINE MESYLATE 0.5 MG: 0.5 TABLET ORAL at 08:12

## 2021-07-26 RX ADMIN — MULTIPLE VITAMINS W/ MINERALS TAB 1 TABLET: TAB at 08:12

## 2021-07-26 ASSESSMENT — ACTIVITIES OF DAILY LIVING (ADL)
DRESS: SCRUBS (BEHAVIORAL HEALTH)
LAUNDRY: UNABLE TO COMPLETE
ORAL_HYGIENE: INDEPENDENT
HYGIENE/GROOMING: INDEPENDENT

## 2021-07-26 NOTE — PROGRESS NOTES
[facility]    Spiritual Health Progress Note    Date of Service (when I saw the patient): 07/26/2021     Assessment & Plan   Rohith Rosa is a 45 year old female who was admitted on 7/10/2021. Visit was a follow-up to check in with patient from previous visit. Brought patient from back area to visit with . Patient a many things to discuss. She gave a sample of a poem that she wrote which seems to be a healthy way to express herself. Her thinking was pretty coherent and seemed to have a pretty good sense of humor. At times she would speak things far from reality but related to the themes at hand. She wanted to pray and had many specific things for which to pray. She said she would enjoy follow-up from any of the chaplains.    Rev. Napoleon Blanca  Volunteer

## 2021-07-26 NOTE — PLAN OF CARE
MÓNICA emailed the patient's . Asking their opinion on the patient transferring to an IRTS facility for further stabilization. MÓNICA explained that they will start submitting IRTS referrals.

## 2021-07-26 NOTE — PROGRESS NOTES
[facility]  Psychiatric Progress Note      Impression:    This is a 45 year old yo female with a history of bipolar disorder and multiple hospitalizations.    Rohith is up in the lounge when I see her today. She agrees to speak with me very briefly, and only answers with one or two word responses. She has been very guarded the last few times I have spoken with her, likely due to the fact I testified at her court hearing on Friday in support of commitment. Overall it seems her mood lability continues to improve, has been less intrusive with others. Attending groups throughout the day. She has been sleeping well at night. She has been denying any hallucinations or suicidal thoughts to staff. No report side effects from medications. Will decrease Ativan further tomorrow, stop in next day or two.      Educated regarding medication indications, risks, benefits, side effects, contraindications and possible interactions. Verbally expressed understanding.        Diagnoses:   Bipolar 1 disorder, current episode manic      Attestation:  Patient has been seen and evaluated by me,  Shakira Kilpatrick NP          Interim History:   The patient's care was discussed with the treatment team and chart notes were reviewed.          Medications:     Current Facility-Administered Medications Ordered in Epic   Medication Dose Route Frequency Last Rate Last Admin     acetaminophen (TYLENOL) tablet 650 mg  650 mg Oral Q4H PRN   650 mg at 07/11/21 0959     alum & mag hydroxide-simethicone (MAALOX) suspension 30 mL  30 mL Oral Q4H PRN         benztropine (COGENTIN) tablet 1 mg  1 mg Oral BID PRN         cloNIDine (CATAPRES) tablet 0.1 mg  0.1 mg Oral BID PRN   0.1 mg at 07/11/21 1746     diphenhydrAMINE (BENADRYL) injection 25-50 mg  25-50 mg Intramuscular Q6H PRN        Or     diphenhydrAMINE (BENADRYL) capsule 25-50 mg  25-50 mg Oral Q6H PRN   50 mg at 07/12/21 0832     divalproex sodium extended-release (DEPAKOTE ER) 24 hr  "tablet 750 mg  750 mg Oral At Bedtime         fluPHENAZine (PROLIXIN) half-tab 0.5 mg  0.5 mg Oral BID         haloperidol lactate (HALDOL) injection 5-10 mg  5-10 mg Intramuscular Q6H PRN        Or     haloperidol (HALDOL) tablet 5-10 mg  5-10 mg Oral Q6H PRN   10 mg at 07/12/21 0832     hydrOXYzine (ATARAX) tablet 25-50 mg  25-50 mg Oral Q4H PRN         levothyroxine (SYNTHROID/LEVOTHROID) tablet 50 mcg  50 mcg Oral QAM AC   50 mcg at 07/12/21 0643     lithium (ESKALITH CR/LITHOBID) CR tablet 450 mg  450 mg Oral At Bedtime         LORazepam (ATIVAN) tablet 1-2 mg  1-2 mg Oral Q6H PRN   2 mg at 07/12/21 0832    Or     LORazepam (ATIVAN) injection 1-2 mg  1-2 mg Intramuscular Q6H PRN         melatonin tablet 3 mg  3 mg Oral At Bedtime PRN   3 mg at 07/11/21 2359     multivitamin w/minerals (THERA-VIT-M) tablet 1 tablet  1 tablet Oral Daily         nicotine (NICORETTE) gum 2 mg  2 mg Buccal Q1H PRN         OLANZapine (zyPREXA) tablet 5-10 mg  5-10 mg Oral TID PRN        Or     OLANZapine (zyPREXA) injection 5-10 mg  5-10 mg Intramuscular TID PRN   10 mg at 07/11/21 0918     senna-docusate (SENOKOT-S/PERICOLACE) 8.6-50 MG per tablet 1 tablet  1 tablet Oral BID PRN         Vitamin D3 (CHOLECALCIFEROL) tablet 50 mcg  50 mcg Oral Daily   50 mcg at 07/12/21 0832     No current Epic-ordered outpatient medications on file.            10 point ROS- denies acute concerns       Allergies:     Allergies   Allergen Reactions     Shellfish-Derived Products Rash            Psychiatric Examination:   /64   Pulse 69   Temp 97.8  F (36.6  C) (Temporal)   Resp 16   Ht 1.626 m (5' 4\")   Wt 85.4 kg (188 lb 3.2 oz)   SpO2 98%   BMI 32.30 kg/m    Weight is 188 lbs 3.2 oz  Body mass index is 32.3 kg/m .    Appearance: awake, alert, dressed in hospital scrubs, hair is disheveled  Attitude: cooperative though guarded with me today  Eye Contact: fair  Mood: lability continues to improve  Affect:  mood congruent  Speech:  clear, " coherent, not as loud  Psychomotor Behavior:  no evidence of tardive dyskinesia, dystonia, or tics  Thought Process: difficult to assess today as she offers very little spontaneous conversation  Associations: no loosening of associations noted  Thought Content:  Denies suicidal thoughts, denies hallucinations, appears preoccupied at times  Insight:  limited  Judgment:  limited  Oriented to: person, place  Attention Span and Concentration:  limited  Recent and Remote Memory:  fair  Fund of Knowledge: difficult to assess  Muscle Strength and Tone: normal  Gait and Station: Normal           Labs:     Results for orders placed or performed during the hospital encounter of 07/10/21 (from the past 24 hour(s))   Asymptomatic COVID-19 Virus (Coronavirus) by PCR Nasopharyngeal    Specimen: Nasopharyngeal; Swab    Narrative    The following orders were created for panel order Asymptomatic COVID-19 Virus (Coronavirus) by PCR Nasopharyngeal.  Procedure                               Abnormality         Status                     ---------                               -----------         ------                     SARS-COV2 (COVID-19) Vir...[[id number]]  Normal              Final result                 Please view results for these tests on the individual orders.   SARS-COV2 (COVID-19) Virus RT-PCR    Specimen: Nasopharyngeal; Swab   Result Value Ref Range    SARS CoV2 PCR Negative Negative    Narrative    Testing was performed using the Xpert Xpress SARS-CoV-2 Assay on the   Cepheid Gene-Xpert Instrument Systems. Additional information about   this Emergency Use Authorization (EUA) assay can be found via the Lab   Guide. This test should be ordered for the detection of SARS-CoV-2 in   individuals who meet SARS-CoV-2 clinical and/or epidemiological   criteria. Test performance is unknown in asymptomatic patients. This   test is for in vitro diagnostic use under the FDA EUA for   laboratories certified under CLIA to perform high  complexity testing.   This test has not been FDA cleared or approved. A negative result   does not rule out the presence of PCR inhibitors in the specimen or   target RNA in concentration below the limit of detection for the   assay. The possibility of a false negative should be considered if   the patient's recent exposure or clinical presentation suggests   COVID-19. This test was validated by [facility] laboratory. This laboratory is certified under the Clinical Laboratory Improve  ment Amendments (CLIA) as qualified to perform high complexity testing.           BEHAVIORAL TEAM DISCUSSION    Progress: Gradual improvement. Mood continues to improve. Sleeping well at night. Tolerating medications, denies side effects.    Continued Stay Criteria/Rationale: monitor for mood lability. Continue to stabilize on medications.    Medical/Physical: denies acute concerns  Precautions:   Falls precaution?: No  Behavioral Orders   Procedures     Code 1 - Restrict to Unit     Routine Programming     As clinically indicated     Status 15     Every 15 minutes.       Plan:     Decrease Ativan to 0.25 mg tomorrow x 2 days then stop  Continue Lithium  mg at bedtime  Continue Depakote ER 1,500 mg at bedtime  Continue Prolixin 10 mg BID  Increase Cogentin to 1 mg BID  Stay of commitment granted- IRTS referrals to be sent         Participants: Shakira Kilpatrick CNP, Nursing, OT, SW

## 2021-07-26 NOTE — PLAN OF CARE
"Face to face shift report received from Rashard FIGUEROA RN.       Problem: Behavioral Health Plan of Care  Goal: Patient-Specific Goal (Individualization)  Description: Pt will sleep 6-8 hours per night.   Pt will eat 50% of meals.   Pt will attend 50% of groups when on open unit when able.  Pt will comply with nursing assessment as needed.   Pt will accept PRN medications when offered.    Pt able to wean from MHICU for limited times if she can maintain appropriate behaviors and cooperate with safety checks upon returning to MHICU. She does better with lower stimulating environments.  Outcome: Improving  Note: Weaned to open unit entire shift. Compliant with safety checks upon returning to MHICU. Behavior appropriate. Calm, cooperative, and medication compliant. Denies mental health issues, reports \"I'm doing well.\" At beginning of shift pt pleasant, however at HS pt irritable and short during conversation. Attends groups. No reports of pain.          Problem: Thought Process Alteration  Goal: Optimal Thought Clarity  Description: Pt will have a linear reality based conversation by discharge.   Outcome: Improving         Face to face end of shift report to be communicated to oncoming RN.     "

## 2021-07-26 NOTE — PLAN OF CARE
SHIFT SUMMARY:  Denies SI/HI, AH/VH, pain, anxiety and depression. Less intrusive on other patient's boundaries and less boisterous. Eye contact has improved.  Attending group.  Weaned the entire shift.  Report given to oncoming nurse.    Problem: Behavioral Health Plan of Care  Goal: Patient-Specific Goal (Individualization)  Description: Pt will sleep 6-8 hours per night.   Pt will eat 50% of meals.   Pt will attend 50% of groups when on open unit when able.  Pt will comply with nursing assessment as needed.   Pt will accept PRN medications when offered.    Pt able to wean from MHICU for limited times if she can maintain appropriate behaviors and cooperate with safety checks upon returning to MHICU. She does better with lower stimulating environments.  Outcome: Improving     Problem: Behavioral Health Plan of Care  Goal: Absence of New-Onset Illness or Injury  Outcome: Improving     Problem: Thought Process Alteration  Goal: Optimal Thought Clarity  Description: Pt will have a linear reality based conversation by discharge.   Outcome: Improving

## 2021-07-27 PROCEDURE — 250N000013 HC RX MED GY IP 250 OP 250 PS 637: Performed by: NURSE PRACTITIONER

## 2021-07-27 PROCEDURE — 124N000001 HC R&B MH

## 2021-07-27 PROCEDURE — 99232 SBSQ HOSP IP/OBS MODERATE 35: CPT | Performed by: NURSE PRACTITIONER

## 2021-07-27 RX ORDER — LORAZEPAM 0.5 MG/1
0.25 TABLET ORAL 3 TIMES DAILY
Status: COMPLETED | OUTPATIENT
Start: 2021-07-27 | End: 2021-07-29

## 2021-07-27 RX ADMIN — LEVOTHYROXINE SODIUM 50 MCG: 0.05 TABLET ORAL at 06:37

## 2021-07-27 RX ADMIN — LORAZEPAM 0.25 MG: 0.5 TABLET ORAL at 20:20

## 2021-07-27 RX ADMIN — FLUPHENAZINE HYDROCHLORIDE 10 MG: 5 TABLET, FILM COATED ORAL at 20:19

## 2021-07-27 RX ADMIN — LORAZEPAM 0.5 MG: 0.5 TABLET ORAL at 13:19

## 2021-07-27 RX ADMIN — MULTIPLE VITAMINS W/ MINERALS TAB 1 TABLET: TAB at 08:21

## 2021-07-27 RX ADMIN — LITHIUM CARBONATE 900 MG: 450 TABLET, EXTENDED RELEASE ORAL at 20:20

## 2021-07-27 RX ADMIN — DIVALPROEX SODIUM 1500 MG: 500 TABLET, FILM COATED, EXTENDED RELEASE ORAL at 20:20

## 2021-07-27 RX ADMIN — BENZTROPINE MESYLATE 1 MG: 1 TABLET ORAL at 20:20

## 2021-07-27 RX ADMIN — FLUPHENAZINE HYDROCHLORIDE 10 MG: 5 TABLET, FILM COATED ORAL at 08:21

## 2021-07-27 RX ADMIN — OLANZAPINE 10 MG: 10 TABLET, FILM COATED ORAL at 08:22

## 2021-07-27 RX ADMIN — BENZTROPINE MESYLATE 1 MG: 1 TABLET ORAL at 08:21

## 2021-07-27 RX ADMIN — Medication 50 MCG: at 08:22

## 2021-07-27 RX ADMIN — LORAZEPAM 0.5 MG: 0.5 TABLET ORAL at 06:36

## 2021-07-27 NOTE — PLAN OF CARE
Face to face shift report received from Rashard FIGUEROA RN.       Problem: Behavioral Health Plan of Care  Goal: Patient-Specific Goal (Individualization)  Description: Pt will sleep 6-8 hours per night.   Pt will eat 50% of meals.   Pt will attend 50% of groups when on open unit when able.  Pt will comply with nursing assessment as needed.   Pt will accept PRN medications when offered.    Outcome: Improving  Note: Pt moved to open unit this shift. Calm, cooperative, and medication compliant. Denies mental health issues. Spends majority of shift in lounge area. Social with others. Attends groups. No reports of pain.          Problem: Thought Process Alteration  Goal: Optimal Thought Clarity  Description: Pt will have a linear reality based conversation by discharge.   Outcome: Improving         Face to face end of shift report to be communicated to oncoming RN.

## 2021-07-27 NOTE — PLAN OF CARE
1:1 time with the patient.  No changes made to the discharge plan at this time.   See the AVS for follow up appointments and recommendations.     SW spoke to patient. The patient stressed she was concerned with the treament plan of discharging to a IRTS facility before she could go home. She stated she spoke to her  who apparently told her he was okay with her to go home with services.       MÓNICA called Artem TERRAZAS the patient's . MÓNICA asked patient's  if he was okay with the plan of patient discharging home when ready with services versus an IRTS. Artem stated he called the [facility] IRTS intake and they told him that the waiting list was a ways out. He further explained that an IRTS might be too extreme considering the patient was granted a Stayed order instead of a Full Commitment. He suggested to contact Eh tomlinson patient's  through [facility] and get his opinion.     MÓNICA called Eh GRACE from [facility]. Eh GRACE is going to call the patient tomorrow 7/28/2021 and speak to her to find out of she is back to baseline and offer her [facility]HS through [facility] and make that referral. Eh will call and update MÓNICA about where he believes she is back to baseline or if she has aways to stabilize. He stated the three years he has worked with her in the past she never had this extreme of a MH setback.

## 2021-07-27 NOTE — PROGRESS NOTES
" [facility]  Psychiatric Progress Note      Impression:    This is a 45 year old yo female with a history of bipolar disorder and multiple hospitalizations.    Rohith is up in the lounge when I see her today. Initially she offers little and is quite dismissive, though she then stops and states \"can I be valentin with you?\" She then states that she feels upset with me about her court hearing. \"I just want to go home, I don't want to go to another facility\". She has shown significant improvement in mood lability, no longer making delusional statements about other patients being in relationships with her. She states that she intends on taking all of her medications at discharge \"it's been years since I felt this good\". She also states she will attend all of her appointments. She has been sleeping well, denies hallucinations, denies suicidal thoughts.        Educated regarding medication indications, risks, benefits, side effects, contraindications and possible interactions. Verbally expressed understanding.        Diagnoses:   Bipolar 1 disorder, current episode manic      Attestation:  Patient has been seen and evaluated by me,  Shakira Kilpatrick NP          Interim History:   The patient's care was discussed with the treatment team and chart notes were reviewed.          Medications:     Current Facility-Administered Medications Ordered in Epic   Medication Dose Route Frequency Last Rate Last Admin     acetaminophen (TYLENOL) tablet 650 mg  650 mg Oral Q4H PRN   650 mg at 07/11/21 0959     alum & mag hydroxide-simethicone (MAALOX) suspension 30 mL  30 mL Oral Q4H PRN         benztropine (COGENTIN) tablet 1 mg  1 mg Oral BID PRN         cloNIDine (CATAPRES) tablet 0.1 mg  0.1 mg Oral BID PRN   0.1 mg at 07/11/21 1746     diphenhydrAMINE (BENADRYL) injection 25-50 mg  25-50 mg Intramuscular Q6H PRN        Or     diphenhydrAMINE (BENADRYL) capsule 25-50 mg  25-50 mg Oral Q6H PRN   50 mg at 07/12/21 0832     " "divalproex sodium extended-release (DEPAKOTE ER) 24 hr tablet 750 mg  750 mg Oral At Bedtime         fluPHENAZine (PROLIXIN) half-tab 0.5 mg  0.5 mg Oral BID         haloperidol lactate (HALDOL) injection 5-10 mg  5-10 mg Intramuscular Q6H PRN        Or     haloperidol (HALDOL) tablet 5-10 mg  5-10 mg Oral Q6H PRN   10 mg at 07/12/21 0832     hydrOXYzine (ATARAX) tablet 25-50 mg  25-50 mg Oral Q4H PRN         levothyroxine (SYNTHROID/LEVOTHROID) tablet 50 mcg  50 mcg Oral QAM AC   50 mcg at 07/12/21 0643     lithium (ESKALITH CR/LITHOBID) CR tablet 450 mg  450 mg Oral At Bedtime         LORazepam (ATIVAN) tablet 1-2 mg  1-2 mg Oral Q6H PRN   2 mg at 07/12/21 0832    Or     LORazepam (ATIVAN) injection 1-2 mg  1-2 mg Intramuscular Q6H PRN         melatonin tablet 3 mg  3 mg Oral At Bedtime PRN   3 mg at 07/11/21 2359     multivitamin w/minerals (THERA-VIT-M) tablet 1 tablet  1 tablet Oral Daily         nicotine (NICORETTE) gum 2 mg  2 mg Buccal Q1H PRN         OLANZapine (zyPREXA) tablet 5-10 mg  5-10 mg Oral TID PRN        Or     OLANZapine (zyPREXA) injection 5-10 mg  5-10 mg Intramuscular TID PRN   10 mg at 07/11/21 0918     senna-docusate (SENOKOT-S/PERICOLACE) 8.6-50 MG per tablet 1 tablet  1 tablet Oral BID PRN         Vitamin D3 (CHOLECALCIFEROL) tablet 50 mcg  50 mcg Oral Daily   50 mcg at 07/12/21 0832     No current Epic-ordered outpatient medications on file.            10 point ROS- denies acute concerns       Allergies:     Allergies   Allergen Reactions     Shellfish-Derived Products Rash            Psychiatric Examination:   /83   Pulse 81   Temp 97.9  F (36.6  C) (Tympanic)   Resp 16   Ht 1.626 m (5' 4\")   Wt 85.4 kg (188 lb 3.2 oz)   SpO2 98%   BMI 32.30 kg/m    Weight is 188 lbs 3.2 oz  Body mass index is 32.3 kg/m .    Appearance: awake, alert, dressed in hospital scrubs, hair is disheveled  Attitude: cooperative, guarded  Eye Contact: fair  Mood: some irritability regarding discharge " planning, anxious  Affect:  mood congruent  Speech:  clear, coherent  Psychomotor Behavior:  no evidence of tardive dyskinesia, dystonia, or tics  Thought Process: seems linear and goal oriented  Associations: no loosening of associations noted  Thought Content:  Denies suicidal thoughts, denies hallucinations  Insight:  limited  Judgment:  limited  Oriented to: person, place, time  Attention Span and Concentration:  limited  Recent and Remote Memory:  fair  Fund of Knowledge: appropriate for education  Muscle Strength and Tone: normal  Gait and Station: Normal           Labs:     No results found for this or any previous visit (from the past 24 hour(s)).        BEHAVIORAL TEAM DISCUSSION    Progress: Gradual improvement. Mood continues to improve. Sleeping well at night. Tolerating medications, denies side effects.    Continued Stay Criteria/Rationale: monitor for mood lability. Continue to stabilize on medications.    Medical/Physical: denies acute concerns  Precautions:   Falls precaution?: No  Behavioral Orders   Procedures     Code 1 - Restrict to Unit     Routine Programming     As clinically indicated     Status 15     Every 15 minutes.       Plan:     Decrease Ativan to 0.25 mg x 2 days then stop- orders placed  Continue Lithium  mg at bedtime  Continue Depakote ER 1,500 mg at bedtime  Continue Prolixin 10 mg BID  Continue Cogentin 1 mg BID  Stay of commitment granted         Participants: Shakira Kilpatrick CNP, Nursing, OT, SW

## 2021-07-27 NOTE — PLAN OF CARE
"SHIFT SUMMARY:  Denies pain, SI/HI, AH/VH, depression and anxiety.  Noted by staff to be withdrawn and mildly agitated - PRN zyprexa 10 mg PO given for agitation at 0822 - effective. Repeatedly asking about her placement and requests to go to a [place] group home and stating she does not \"feel heard\" by her , provider, unit staff, and in general.  Order for visit with  put in per patient request. Attended groups. Weaned the entire shift. End of shift report given to oncoming nurse.      Problem: Behavioral Health Plan of Care  Goal: Patient-Specific Goal (Individualization)  Description: Pt will sleep 6-8 hours per night.   Pt will eat 50% of meals.   Pt will attend 50% of groups when on open unit when able.  Pt will comply with nursing assessment as needed.   Pt will accept PRN medications when offered.    Pt able to wean from MHICU for limited times if she can maintain appropriate behaviors and cooperate with safety checks upon returning to MHICU. She does better with lower stimulating environments.  Outcome: Improving  Goal: Optimized Coping Skills in Response to Life Stressors  Outcome: Improving     Problem: Thought Process Alteration  Goal: Optimal Thought Clarity  Description: Pt will have a linear reality based conversation by discharge.   Outcome: Improving     "

## 2021-07-27 NOTE — PLAN OF CARE
Face to face received from Janet SOSA RN.   Rounding completed, pt observed in bed at this time appears to be sleeping.    15 minute and PRN checks completed this shift.   Pt asleep 7.5 hours this shift. No concerns and/or complaints this shift.    Problem: Behavioral Health Plan of Care  Goal: Patient-Specific Goal (Individualization)  Description: Pt will sleep 6-8 hours per night.   Pt will eat 50% of meals.   Pt will attend 50% of groups when on open unit when able.  Pt will comply with nursing assessment as needed.   Pt will accept PRN medications when offered.    Pt able to wean from MHICU for limited times if she can maintain appropriate behaviors and cooperate with safety checks upon returning to MHICU. She does better with lower stimulating environments.  Outcome: No Change  Note:  Pt asleep this shift, unable to assess.        Problem: Thought Process Alteration  Goal: Optimal Thought Clarity  Description: Pt will have a linear reality based conversation by discharge.   Outcome: No Change   Pt asleep this shift, unable to assess.     Face to face end of shift report  to be communicated to oncoming RN.     Luiza May RN  7/27/2021  12:57 AM

## 2021-07-28 LAB — LITHIUM SERPL-SCNC: 0.4 MMOL/L

## 2021-07-28 PROCEDURE — 80178 ASSAY OF LITHIUM: CPT | Performed by: NURSE PRACTITIONER

## 2021-07-28 PROCEDURE — 124N000001 HC R&B MH

## 2021-07-28 PROCEDURE — 250N000013 HC RX MED GY IP 250 OP 250 PS 637: Performed by: NURSE PRACTITIONER

## 2021-07-28 PROCEDURE — 36415 COLL VENOUS BLD VENIPUNCTURE: CPT | Performed by: NURSE PRACTITIONER

## 2021-07-28 RX ADMIN — OLANZAPINE 10 MG: 10 TABLET, FILM COATED ORAL at 14:38

## 2021-07-28 RX ADMIN — FLUPHENAZINE HYDROCHLORIDE 10 MG: 5 TABLET, FILM COATED ORAL at 08:08

## 2021-07-28 RX ADMIN — LORAZEPAM 0.25 MG: 0.5 TABLET ORAL at 21:04

## 2021-07-28 RX ADMIN — FLUPHENAZINE HYDROCHLORIDE 10 MG: 5 TABLET, FILM COATED ORAL at 21:04

## 2021-07-28 RX ADMIN — BENZTROPINE MESYLATE 1 MG: 1 TABLET ORAL at 19:08

## 2021-07-28 RX ADMIN — Medication 50 MCG: at 08:09

## 2021-07-28 RX ADMIN — LITHIUM CARBONATE 900 MG: 450 TABLET, EXTENDED RELEASE ORAL at 21:04

## 2021-07-28 RX ADMIN — LORAZEPAM 0.25 MG: 0.5 TABLET ORAL at 13:11

## 2021-07-28 RX ADMIN — LEVOTHYROXINE SODIUM 50 MCG: 0.05 TABLET ORAL at 06:08

## 2021-07-28 RX ADMIN — BENZTROPINE MESYLATE 1 MG: 1 TABLET ORAL at 08:09

## 2021-07-28 RX ADMIN — MULTIPLE VITAMINS W/ MINERALS TAB 1 TABLET: TAB at 08:09

## 2021-07-28 RX ADMIN — BENZTROPINE MESYLATE 1 MG: 1 TABLET ORAL at 21:05

## 2021-07-28 RX ADMIN — DIVALPROEX SODIUM 1500 MG: 500 TABLET, FILM COATED, EXTENDED RELEASE ORAL at 21:03

## 2021-07-28 RX ADMIN — LORAZEPAM 0.25 MG: 0.5 TABLET ORAL at 06:08

## 2021-07-28 NOTE — PLAN OF CARE
Face to face received from Janet SOSA RN.   Rounding completed, pt observed in bed at this time appears to be sleeping.     15 minute and PRN checks completed this shift.  Pt asleep 6 hours this shift.   Pt up in lounge this morning, socializied with peers.   No concerns and/or requests this shift.     Problem: Behavioral Health Plan of Care  Goal: Patient-Specific Goal (Individualization)  Description: Pt will sleep 6-8 hours per night.   Pt will eat 50% of meals.   Pt will attend 50% of groups when on open unit when able.  Pt will comply with nursing assessment as needed.   Pt will accept PRN medications when offered.    Outcome: No Change  Note: Pt asleep this shift, unable to assess.        Problem: Thought Process Alteration  Goal: Optimal Thought Clarity  Description: Pt will have a linear reality based conversation by discharge.   Outcome: No Change   Pt asleep this shift, unable to assess.       Face to face end of shift report  to be communicated to oncoming RN.     Luiza May RN  7/28/2021  4:07 AM

## 2021-07-28 NOTE — PLAN OF CARE
MÓNICA spoke to patient. She had an odor to her. Not sure if it was from her hair. MÓNICA explained that the plan is for the patient to discharge home with services, but MÓNICA will submit IRTS referrals also. Patient was upset and expressed she wanted to go home. MÓNICA.  explained that there needs to be a back up plan.     MÓNICA called Eh Shepard the [facility]  and left a message for him to contact MÓNICA back. Eh Shepard was suppose to patient and offer [facility] services through [facility] and see if she is back to baseline.       MÓNICA faxed and emailed referral to [facility] IRTS.

## 2021-07-28 NOTE — PLAN OF CARE
SHIFT SUMMARY:  Denies pain, SI/HI, AH/VH, depression and anxiety. At 0830, Repeatedly asking to meet with MÓNICA Mcnamara, when they had agreed to meet at 10:00, but otherwise calm and interacting with others - respecting others' boundaries. Showered after breakfast.  Attended group. At 1439, PRN zyprexa 10 mg PO given for mild agitation, pressured speech and intrusive of others' boundaries.  Report given to oncoming nurse.    Problem: Behavioral Health Plan of Care  Goal: Patient-Specific Goal (Individualization)  Description: Pt will sleep 6-8 hours per night.   Pt will eat 50% of meals.   Pt will attend 50% of groups when on open unit when able.  Pt will comply with nursing assessment as needed.   Pt will accept PRN medications when offered.    Has been sleeping 6-8 hours, eating at least 50% of meals, attending group, weaning entire shifts, and taking scheduled and PRN medications when offered.    Outcome: Improving  Goal: Absence of New-Onset Illness or Injury  Outcome: Improving  Intervention: Identify and Manage Fall Risk  Recent Flowsheet Documentation  Taken 7/28/2021 0800 by Rashard Cameron, RN  Safety Measures: self-directed behavior promoted    Balanced and steady ambulation.     Problem: Thought Process Alteration  Goal: Optimal Thought Clarity  Description: Pt will have a linear reality based conversation by discharge.   Outcome: Improving    Able to have linear, reality-based conversations, mostly focusing on a visit from a  and having a meeting with MÓNICA Mcnamara.

## 2021-07-29 PROCEDURE — 99232 SBSQ HOSP IP/OBS MODERATE 35: CPT | Performed by: NURSE PRACTITIONER

## 2021-07-29 PROCEDURE — 124N000001 HC R&B MH

## 2021-07-29 PROCEDURE — 250N000013 HC RX MED GY IP 250 OP 250 PS 637: Performed by: NURSE PRACTITIONER

## 2021-07-29 RX ADMIN — LORAZEPAM 0.25 MG: 0.5 TABLET ORAL at 06:18

## 2021-07-29 RX ADMIN — LEVOTHYROXINE SODIUM 50 MCG: 0.05 TABLET ORAL at 06:18

## 2021-07-29 RX ADMIN — LITHIUM CARBONATE 900 MG: 450 TABLET, EXTENDED RELEASE ORAL at 20:15

## 2021-07-29 RX ADMIN — LORAZEPAM 0.25 MG: 0.5 TABLET ORAL at 13:48

## 2021-07-29 RX ADMIN — BENZTROPINE MESYLATE 1 MG: 1 TABLET ORAL at 08:13

## 2021-07-29 RX ADMIN — OLANZAPINE 10 MG: 10 TABLET, FILM COATED ORAL at 11:01

## 2021-07-29 RX ADMIN — FLUPHENAZINE HYDROCHLORIDE 10 MG: 5 TABLET, FILM COATED ORAL at 08:13

## 2021-07-29 RX ADMIN — Medication 50 MCG: at 08:13

## 2021-07-29 RX ADMIN — DIVALPROEX SODIUM 1500 MG: 500 TABLET, FILM COATED, EXTENDED RELEASE ORAL at 20:15

## 2021-07-29 RX ADMIN — FLUPHENAZINE HYDROCHLORIDE 10 MG: 5 TABLET, FILM COATED ORAL at 20:14

## 2021-07-29 RX ADMIN — BENZTROPINE MESYLATE 1 MG: 1 TABLET ORAL at 20:14

## 2021-07-29 RX ADMIN — MULTIPLE VITAMINS W/ MINERALS TAB 1 TABLET: TAB at 08:13

## 2021-07-29 ASSESSMENT — ACTIVITIES OF DAILY LIVING (ADL)
ORAL_HYGIENE: INDEPENDENT
LAUNDRY: UNABLE TO COMPLETE
DRESS: SCRUBS (BEHAVIORAL HEALTH);INDEPENDENT
ORAL_HYGIENE: INDEPENDENT
DRESS: SCRUBS (BEHAVIORAL HEALTH);INDEPENDENT
HYGIENE/GROOMING: INDEPENDENT
HYGIENE/GROOMING: INDEPENDENT;SHOWER
LAUNDRY: UNABLE TO COMPLETE

## 2021-07-29 NOTE — PLAN OF CARE
"  Problem: Behavioral Health Plan of Care  Goal: Patient-Specific Goal (Individualization)  Description: Pt will sleep 6-8 hours per night.   Pt will eat 50% of meals.   Pt will attend 50% of groups when on open unit when able.  Pt will comply with nursing assessment as needed.   Pt will accept PRN medications when offered.    Outcome: No Change  Note: 15:30; Received end of shift report from ANTONIO Wetzel. Pt sitting in lounge area    19:00: PRN cogentin given for increase in paranoia activity, mistrustful demeanor towards staff, tense appearance, poor eye contact; offers little communication, increase neck flexion/leaning to left noted.     19:45: Sarcastic/defensive remarks towards UA's when encouraging group participation, states, \"I'm not going because you told me about, and I have that choice.\"     19:50: Lab here to draw valproic acid; compliant;tolerated well. Participates in afternoon groups.Overall reduction in s/s becca, behaviors appropriate with peers, less intrusive, less disruptive.     21:00: Lithium level of 0.4 mmol/l; compliant with HS medication administration, denies need for prn pharmacological intervention, slight reduction in frequency of neck flexion et tenseness, speech less pressured, less irritable. Continues to offer little communication, answers with short, simple comments.     23:00: Pt displaying s/s of sleep since approximately 22:30.     Face to face end of shift report communicated to on-coming [facility] staff.     Yesenia Wilkins RN  7/28/2021  10:59 PM                   Problem: Thought Process Alteration  Goal: Optimal Thought Clarity  Description: Pt will have a linear reality based conversation by discharge.   Outcome: No Change     Problem: Behavioral Health Plan of Care  Goal: Plan of Care Review  Recent Flowsheet Documentation  Taken 7/28/2021 1600 by Yesenia Wilkins, RN  Plan of Care Reviewed With: patient  Patient Agreement with Plan of Care: agrees with comment (describe)   "

## 2021-07-29 NOTE — PROGRESS NOTES
" [facility]  Psychiatric Progress Note      Impression:    This is a 45 year old yo female with a history of bipolar disorder and multiple hospitalizations.    Up on unit socializing with peers. Pleasant in conversation. Expressed that she feels her medications have really helped and that she feels, \"better than I have in years.\" She believes that she is discharging home on Monday; however, this provider has not heard that. We have been waiting on her  to call and touch base regarding [facility] services and his thoughts on her current status. IRTS referrals have also been submitted. Nursing had reported some concern about increasing symptoms. No paranoia or irritability noted during assessment. Lithium level is slightly lower than the last one; however, both Lithium and Depakote were drawn in the AM at 0947, around the 12 hour post dosing geovanna. This would result in slightly higher levels for both medications.        Diagnoses:   Bipolar 1 disorder, current episode manic    Attestation:  Patient has been seen and evaluated by me,  Ke Thao NP          Interim History:   The patient's care was discussed with the treatment team and chart notes were reviewed.          Medications:     Current Facility-Administered Medications Ordered in Epic   Medication Dose Route Frequency Last Rate Last Admin     acetaminophen (TYLENOL) tablet 650 mg  650 mg Oral Q4H PRN   650 mg at 07/11/21 0959     alum & mag hydroxide-simethicone (MAALOX) suspension 30 mL  30 mL Oral Q4H PRN         benztropine (COGENTIN) tablet 1 mg  1 mg Oral BID PRN         cloNIDine (CATAPRES) tablet 0.1 mg  0.1 mg Oral BID PRN   0.1 mg at 07/11/21 1746     diphenhydrAMINE (BENADRYL) injection 25-50 mg  25-50 mg Intramuscular Q6H PRN        Or     diphenhydrAMINE (BENADRYL) capsule 25-50 mg  25-50 mg Oral Q6H PRN   50 mg at 07/12/21 0832     divalproex sodium extended-release (DEPAKOTE ER) 24 hr tablet 750 mg  750 mg Oral At " "Bedtime         fluPHENAZine (PROLIXIN) half-tab 0.5 mg  0.5 mg Oral BID         haloperidol lactate (HALDOL) injection 5-10 mg  5-10 mg Intramuscular Q6H PRN        Or     haloperidol (HALDOL) tablet 5-10 mg  5-10 mg Oral Q6H PRN   10 mg at 07/12/21 0832     hydrOXYzine (ATARAX) tablet 25-50 mg  25-50 mg Oral Q4H PRN         levothyroxine (SYNTHROID/LEVOTHROID) tablet 50 mcg  50 mcg Oral QAM AC   50 mcg at 07/12/21 0643     lithium (ESKALITH CR/LITHOBID) CR tablet 450 mg  450 mg Oral At Bedtime         LORazepam (ATIVAN) tablet 1-2 mg  1-2 mg Oral Q6H PRN   2 mg at 07/12/21 0832    Or     LORazepam (ATIVAN) injection 1-2 mg  1-2 mg Intramuscular Q6H PRN         melatonin tablet 3 mg  3 mg Oral At Bedtime PRN   3 mg at 07/11/21 2359     multivitamin w/minerals (THERA-VIT-M) tablet 1 tablet  1 tablet Oral Daily         nicotine (NICORETTE) gum 2 mg  2 mg Buccal Q1H PRN         OLANZapine (zyPREXA) tablet 5-10 mg  5-10 mg Oral TID PRN        Or     OLANZapine (zyPREXA) injection 5-10 mg  5-10 mg Intramuscular TID PRN   10 mg at 07/11/21 0918     senna-docusate (SENOKOT-S/PERICOLACE) 8.6-50 MG per tablet 1 tablet  1 tablet Oral BID PRN         Vitamin D3 (CHOLECALCIFEROL) tablet 50 mcg  50 mcg Oral Daily   50 mcg at 07/12/21 0832     No current Epic-ordered outpatient medications on file.            10 point ROS- denies acute concerns       Allergies:     Allergies   Allergen Reactions     Shellfish-Derived Products Rash            Psychiatric Examination:   /89   Pulse 70   Temp 98.1  F (36.7  C) (Temporal)   Resp 14   Ht 1.626 m (5' 4\")   Wt 85.4 kg (188 lb 3.2 oz)   SpO2 98%   BMI 32.30 kg/m    Weight is 188 lbs 3.2 oz  Body mass index is 32.3 kg/m .    Appearance: awake, alert  Attitude: pleasant, cooperative  Eye Contact: appropriate  Mood: Seemingly good  Affect:  mood congruent  Speech:  clear, coherent  Psychomotor Behavior:  no evidence of tardive dyskinesia, dystonia, or tics  Thought Process: " remained logical, linear and goal oriented  Associations: no loosening of associations noted  Thought Content:  Denies suicidal thoughts, denies hallucinations  Insight:  limited per previous reports, this was my first meeting with her  Judgment:  limited  - as above  Oriented to: person, place, time  Attention Span and Concentration: adequate  Recent and Remote Memory:  fair  Fund of Knowledge: appropriate for education  Muscle Strength and Tone: normal  Gait and Station: Normal           Labs:     Results for orders placed or performed during the hospital encounter of 07/10/21 (from the past 24 hour(s))   Lithium level   Result Value Ref Range    Lithium 0.4   mmol/L           BEHAVIORAL TEAM DISCUSSION    Progress: Was pleasant and cooperative, no signs of paranoia or overt psychosis. Unclear if her reports about discharging are accurate. Sleeping well at night. Tolerating medications, denies side effects.    Continued Stay Criteria/Rationale: monitor for mood lability. Continue to stabilize on medications.    Medical/Physical: denies acute concerns  Precautions:   Falls precaution?: No  Behavioral Orders   Procedures     Code 1 - Restrict to Unit     Routine Programming     As clinically indicated     Status 15     Every 15 minutes.       Plan:     Continue Lithium  mg at bedtime  Continue Depakote ER 1,500 mg at bedtime  Continue Prolixin 10 mg BID  Continue Cogentin 1 mg BID  Stay of commitment granted         Participants: Ke Thao, APRN, CNP, Nursing, OT, SW

## 2021-07-29 NOTE — PLAN OF CARE
Problem: Behavioral Health Plan of Care  Goal: Patient-Specific Goal (Individualization)  Description: Pt will sleep 6-8 hours per night.   Pt will eat 50% of meals.   Pt will attend 50% of groups when on open unit when able.  Pt will comply with nursing assessment as needed.   Pt will accept PRN medications when offered.    Outcome: No Change  Note: 15:30: Received end of shift report from ANTONIO Goodman. Pt requesting shampoo et conditioner prior to shower.     18:00: Paranoid/withdrawn activity, adequate food et fluid intake. Denies all mental health criteria; et pain. Becomes irritable when staff or peers ask question; replies with sarcastic remarks. Adequate food et fluid intake. No group participation. Decreased neck flexion noted.     21:00: Continues withdrawn activity, blunted affect, increased irritability, offers little communication, mistrust towards staff et peers, somewhat paranoid. Denies need for prn pharmacological intervention this shift.     Face to face end of shift report to be communicated to on-coming [facility] staff.     Yesenia Wilkins RN  7/29/2021  10:59 PM        Problem: Thought Process Alteration  Goal: Optimal Thought Clarity  Description: Pt will have a linear reality based conversation by discharge.   Outcome: NO CHANGE    Notable blunted affect; increased paranoid/guarded behavior; no group participation

## 2021-07-29 NOTE — PLAN OF CARE
Face to face shift report received from nurse. Rounding completed, pt observed.    Problem: Behavioral Health Plan of Care  Goal: Patient-Specific Goal (Individualization)  Description: Pt will sleep 6-8 hours per night.   Pt will eat 50% of meals.   Pt will attend 50% of groups when on open unit when able.  Pt will comply with nursing assessment as needed.   Pt will accept PRN medications when offered.  Outcome: Improving  Note: Patient in lounge at the start of shift. During morning medication pass patient appeared responding to internal stimuli. Gave patient zyprexa 10mg at 1101 for increasing agitation. Patient has been attending groups.        Problem: Thought Process Alteration  Goal: Optimal Thought Clarity  Description: Pt will have a linear reality based conversation by discharge.   Outcome: Improving     Face to face report will be communicated to oncoming RN.    Rex Skelton RN  7/29/2021

## 2021-07-29 NOTE — PLAN OF CARE
Face to face received from Yesenia HOLM RN.   Rounding completed, pt observed in bed at this time appears to be sleeping.     15 minute and PRN checks completed this shift.   No concerns and/or complaints this shift.   Pt asleep 6 hours this shift.     Problem: Behavioral Health Plan of Care  Goal: Patient-Specific Goal (Individualization)  Description: Pt will sleep 6-8 hours per night.   Pt will eat 50% of meals.   Pt will attend 50% of groups when on open unit when able.  Pt will comply with nursing assessment as needed.   Pt will accept PRN medications when offered.    Outcome: No Change  Note:   Pt asleep this shift, unable to assess.        Problem: Thought Process Alteration  Goal: Optimal Thought Clarity  Description: Pt will have a linear reality based conversation by discharge.   Outcome: No Change  Pt asleep this shift, unable to assess.      Face to face end of shift report  to be communicated to oncoming RN.     Luiza May RN  7/29/2021  3:20 AM

## 2021-07-30 PROCEDURE — 99232 SBSQ HOSP IP/OBS MODERATE 35: CPT | Performed by: NURSE PRACTITIONER

## 2021-07-30 PROCEDURE — 124N000001 HC R&B MH

## 2021-07-30 PROCEDURE — 250N000013 HC RX MED GY IP 250 OP 250 PS 637: Performed by: NURSE PRACTITIONER

## 2021-07-30 RX ORDER — LITHIUM 8 MEQ/5ML
900 SOLUTION ORAL AT BEDTIME
Status: DISCONTINUED | OUTPATIENT
Start: 2021-07-30 | End: 2021-08-02

## 2021-07-30 RX ORDER — OLANZAPINE 10 MG/1
10 TABLET, ORALLY DISINTEGRATING ORAL 2 TIMES DAILY PRN
Status: DISCONTINUED | OUTPATIENT
Start: 2021-07-30 | End: 2021-08-03 | Stop reason: HOSPADM

## 2021-07-30 RX ORDER — LITHIUM 8 MEQ/5ML
900 SOLUTION ORAL AT BEDTIME
Status: DISCONTINUED | OUTPATIENT
Start: 2021-07-30 | End: 2021-07-30

## 2021-07-30 RX ORDER — OLANZAPINE 10 MG/2ML
10 INJECTION, POWDER, FOR SOLUTION INTRAMUSCULAR 2 TIMES DAILY PRN
Status: DISCONTINUED | OUTPATIENT
Start: 2021-07-30 | End: 2021-08-03 | Stop reason: HOSPADM

## 2021-07-30 RX ADMIN — DIVALPROEX SODIUM 1500 MG: 500 TABLET, FILM COATED, EXTENDED RELEASE ORAL at 20:43

## 2021-07-30 RX ADMIN — LITHIUM CARBONATE 900 MG: 300 CAPSULE ORAL at 20:43

## 2021-07-30 RX ADMIN — LEVOTHYROXINE SODIUM 50 MCG: 0.05 TABLET ORAL at 06:29

## 2021-07-30 RX ADMIN — BENZTROPINE MESYLATE 1 MG: 1 TABLET ORAL at 20:43

## 2021-07-30 RX ADMIN — FLUPHENAZINE HYDROCHLORIDE 10 MG: 5 TABLET, FILM COATED ORAL at 20:43

## 2021-07-30 RX ADMIN — BENZTROPINE MESYLATE 1 MG: 1 TABLET ORAL at 08:15

## 2021-07-30 RX ADMIN — MULTIPLE VITAMINS W/ MINERALS TAB 1 TABLET: TAB at 08:15

## 2021-07-30 RX ADMIN — Medication 50 MCG: at 08:15

## 2021-07-30 RX ADMIN — FLUPHENAZINE HYDROCHLORIDE 10 MG: 5 TABLET, FILM COATED ORAL at 08:15

## 2021-07-30 RX ADMIN — ACETAMINOPHEN 650 MG: 325 TABLET, FILM COATED ORAL at 20:43

## 2021-07-30 NOTE — PROGRESS NOTES
[facility]  Psychiatric Progress Note      Impression:    This is a 45 year old yo female with a history of bipolar disorder and multiple hospitalizations.    Out on unit socializing with peers. Pleasant in conversation. Expressed that she feels her medications have really helped and she will be discharging home on Monday. She wanted to ensure her medications are sent to [place]. There is no confirmation on this. Nursing questions pts decline in her condition noting increased in symptoms. They are suspicious that pt maybe cheeking her medications. Will change olanzapine to ODT along with lithium to concentrate to see if there is any improvement. If improvement noted may want to consider depakote sprinkles.  I discussed with pt changing oral prolixin to injectable however she does not want to change this. Pt was very upbeat at the time of assessment. No irritability noted.           Diagnoses:   Bipolar 1 disorder, current episode manic    Attestation:  Patient has been seen and evaluated by me,  PHILL Palacios CNP          Interim History:   The patient's care was discussed with the treatment team and chart notes were reviewed.          Medications:     Current Facility-Administered Medications Ordered in Epic   Medication Dose Route Frequency Last Rate Last Admin     acetaminophen (TYLENOL) tablet 650 mg  650 mg Oral Q4H PRN   650 mg at 07/11/21 0959     alum & mag hydroxide-simethicone (MAALOX) suspension 30 mL  30 mL Oral Q4H PRN         benztropine (COGENTIN) tablet 1 mg  1 mg Oral BID PRN         cloNIDine (CATAPRES) tablet 0.1 mg  0.1 mg Oral BID PRN   0.1 mg at 07/11/21 1746     diphenhydrAMINE (BENADRYL) injection 25-50 mg  25-50 mg Intramuscular Q6H PRN        Or     diphenhydrAMINE (BENADRYL) capsule 25-50 mg  25-50 mg Oral Q6H PRN   50 mg at 07/12/21 0832     divalproex sodium extended-release (DEPAKOTE ER) 24 hr tablet 750 mg  750 mg Oral At Bedtime         fluPHENAZine (PROLIXIN)  "half-tab 0.5 mg  0.5 mg Oral BID         haloperidol lactate (HALDOL) injection 5-10 mg  5-10 mg Intramuscular Q6H PRN        Or     haloperidol (HALDOL) tablet 5-10 mg  5-10 mg Oral Q6H PRN   10 mg at 07/12/21 0832     hydrOXYzine (ATARAX) tablet 25-50 mg  25-50 mg Oral Q4H PRN         levothyroxine (SYNTHROID/LEVOTHROID) tablet 50 mcg  50 mcg Oral QAM AC   50 mcg at 07/12/21 0643     lithium (ESKALITH CR/LITHOBID) CR tablet 450 mg  450 mg Oral At Bedtime         LORazepam (ATIVAN) tablet 1-2 mg  1-2 mg Oral Q6H PRN   2 mg at 07/12/21 0832    Or     LORazepam (ATIVAN) injection 1-2 mg  1-2 mg Intramuscular Q6H PRN         melatonin tablet 3 mg  3 mg Oral At Bedtime PRN   3 mg at 07/11/21 2359     multivitamin w/minerals (THERA-VIT-M) tablet 1 tablet  1 tablet Oral Daily         nicotine (NICORETTE) gum 2 mg  2 mg Buccal Q1H PRN         OLANZapine (zyPREXA) tablet 5-10 mg  5-10 mg Oral TID PRN        Or     OLANZapine (zyPREXA) injection 5-10 mg  5-10 mg Intramuscular TID PRN   10 mg at 07/11/21 0918     senna-docusate (SENOKOT-S/PERICOLACE) 8.6-50 MG per tablet 1 tablet  1 tablet Oral BID PRN         Vitamin D3 (CHOLECALCIFEROL) tablet 50 mcg  50 mcg Oral Daily   50 mcg at 07/12/21 0832     No current Epic-ordered outpatient medications on file.            10 point ROS- denies acute concerns       Allergies:     Allergies   Allergen Reactions     Shellfish-Derived Products Rash            Psychiatric Examination:   /96   Pulse 93   Temp 98.5  F (36.9  C) (Temporal)   Resp 16   Ht 1.626 m (5' 4\")   Wt 85.4 kg (188 lb 3.2 oz)   SpO2 100%   BMI 32.30 kg/m    Weight is 188 lbs 3.2 oz  Body mass index is 32.3 kg/m .    Appearance: awake, alert  Attitude: pleasant, cooperative  Eye Contact: appropriate  Mood: Seemingly good  Affect:  mood congruent  Speech:  clear, coherent  Psychomotor Behavior:  no evidence of tardive dyskinesia, dystonia, or tics  Thought Process: remained logical, linear and goal " oriented  Associations: no loosening of associations noted  Thought Content:  Denies suicidal thoughts, denies hallucinations  Insight:  limited per previous reports, this was my first meeting with her  Judgment:  limited  - as above  Oriented to: person, place, time  Attention Span and Concentration: adequate  Recent and Remote Memory:  fair  Fund of Knowledge: appropriate for education  Muscle Strength and Tone: normal  Gait and Station: Normal           Labs:     No results found for this or any previous visit (from the past 24 hour(s)).        BEHAVIORAL TEAM DISCUSSION    Progress: Was pleasant and cooperative, no signs of paranoia or overt psychosis. Unclear if her reports about discharging are accurate. Sleeping well at night. Tolerating medications, denies side effects.    Continued Stay Criteria/Rationale: monitor for mood lability. Continue to stabilize on medications.    Medical/Physical: denies acute concerns  Precautions:   Falls precaution?: No  Behavioral Orders   Procedures     Code 1 - Restrict to Unit     Routine Programming     As clinically indicated     Status 15     Every 15 minutes.       Plan:     Continue Lithium  mg at bedtime-changed to concentrate  Continue Depakote ER 1,500 mg at bedtime  Continue Prolixin 10 mg BID  Continue Cogentin 1 mg BID  Stay of commitment granted         Participants: Sherry Petty , APRN, CNP, Nursing, OT, SW

## 2021-07-30 NOTE — PLAN OF CARE
1:1 time with the patient.  No changes made to the discharge plan at this time.   See the AVS for follow up appointments and recommendations.           SW spoke to patient. Patient expressed that she was hopeful to discharge home Monday. MÓNICA explained that it all depends how well she does over the weekend.

## 2021-07-30 NOTE — PLAN OF CARE
Face to face received from Yesenia HOLM RN.   Rounding completed, pt observed in bed at this time appears to be sleeping.     15 minute and PRN checks completed this shift.   Pt asleep 7 hours this shift.   No concerns and/or complaints this shift.     Problem: Behavioral Health Plan of Care  Goal: Patient-Specific Goal (Individualization)  Description: Pt will sleep 6-8 hours per night.   Pt will eat 50% of meals.   Pt will attend 50% of groups when on open unit when able.  Pt will comply with nursing assessment as needed.   Pt will accept PRN medications when offered.    Outcome: No Change  Note:   Pt asleep this shift, unable to assess.       Problem: Thought Process Alteration  Goal: Optimal Thought Clarity  Description: Pt will have a linear reality based conversation by discharge.   Outcome: No Change   Pt asleep this shift, unable to assess.    Face to face end of shift report to be communicated to oncoming RN.     Luiza May RN  7/30/2021  1:06 AM

## 2021-07-30 NOTE — PLAN OF CARE
"Face to face end of shift report communicated to oncoming shift.     Pauline Elizabeth RN  7/30/2021  3:11 PM    Problem: Behavioral Health Plan of Care  Goal: Patient-Specific Goal (Individualization)  Description: Pt will sleep 6-8 hours per night.   Pt will eat 50% of meals.   Pt will attend 50% of groups when on open unit when able.  Pt will comply with nursing assessment as needed.   Pt will accept PRN medications when offered.    Outcome: No Change  Note: Shift Summery:    Patient denies SI, HI, AH and VH.    [facility] appropriately.  Patient requests to speak with this writer.  Pleasant and cooperative with nursing assessment and cares.  Patient does not attend groups this shift.     Patient holds head to one side, reports to this writer \"it's due to my ticks from the past, I just hold my head this way now\"      Face to face start of shift report received from RN.  Patient in lounge at this time, will continue to monitor.       "

## 2021-07-30 NOTE — PLAN OF CARE
Face to face shift report received from Pauline BENITEZ RN.       Problem: Behavioral Health Plan of Care  Goal: Patient-Specific Goal (Individualization)  Description: Pt will sleep 6-8 hours per night.   Pt will eat 50% of meals.   Pt will attend 50% of groups when on open unit when able.  Pt will comply with nursing assessment as needed.   Pt will accept PRN medications when offered.    Outcome: No Change  Note: Cooperative. Speech loud at times. Denies mental health issues. Reports that she wishes to return home and hopes to do so soon. Pleasant during conversation. Attends groups. Makes requests appropriately. No reports of pain.          Problem: Thought Process Alteration  Goal: Optimal Thought Clarity  Description: Pt will have a linear reality based conversation by discharge.   Outcome: No Change         Face to face end of shift report to be communicated to oncoming RN.

## 2021-07-31 PROCEDURE — 250N000013 HC RX MED GY IP 250 OP 250 PS 637: Performed by: NURSE PRACTITIONER

## 2021-07-31 PROCEDURE — 124N000001 HC R&B MH

## 2021-07-31 PROCEDURE — 99232 SBSQ HOSP IP/OBS MODERATE 35: CPT | Performed by: NURSE PRACTITIONER

## 2021-07-31 RX ADMIN — FLUPHENAZINE HYDROCHLORIDE 10 MG: 5 TABLET, FILM COATED ORAL at 08:20

## 2021-07-31 RX ADMIN — LITHIUM CARBONATE 900 MG: 300 CAPSULE ORAL at 20:21

## 2021-07-31 RX ADMIN — BENZTROPINE MESYLATE 1 MG: 1 TABLET ORAL at 08:20

## 2021-07-31 RX ADMIN — MULTIPLE VITAMINS W/ MINERALS TAB 1 TABLET: TAB at 08:20

## 2021-07-31 RX ADMIN — OLANZAPINE 10 MG: 10 TABLET, ORALLY DISINTEGRATING ORAL at 08:20

## 2021-07-31 RX ADMIN — ALUMINUM HYDROXIDE, MAGNESIUM HYDROXIDE, AND SIMETHICONE 30 ML: 200; 200; 20 SUSPENSION ORAL at 07:00

## 2021-07-31 RX ADMIN — DIVALPROEX SODIUM 1500 MG: 500 TABLET, FILM COATED, EXTENDED RELEASE ORAL at 20:21

## 2021-07-31 RX ADMIN — FLUPHENAZINE HYDROCHLORIDE 10 MG: 5 TABLET, FILM COATED ORAL at 20:21

## 2021-07-31 RX ADMIN — LEVOTHYROXINE SODIUM 50 MCG: 0.05 TABLET ORAL at 06:01

## 2021-07-31 RX ADMIN — ACETAMINOPHEN 650 MG: 325 TABLET, FILM COATED ORAL at 01:46

## 2021-07-31 RX ADMIN — Medication 50 MCG: at 08:20

## 2021-07-31 RX ADMIN — BENZTROPINE MESYLATE 1 MG: 1 TABLET ORAL at 20:21

## 2021-07-31 ASSESSMENT — ACTIVITIES OF DAILY LIVING (ADL)
ORAL_HYGIENE: INDEPENDENT
DRESS: SCRUBS (BEHAVIORAL HEALTH)
LAUNDRY: UNABLE TO COMPLETE
HYGIENE/GROOMING: INDEPENDENT

## 2021-07-31 ASSESSMENT — MIFFLIN-ST. JEOR: SCORE: 1480.95

## 2021-07-31 NOTE — PLAN OF CARE
Face to face shift report received from ANTONIO Wetzel.       Problem: Behavioral Health Plan of Care  Goal: Patient-Specific Goal (Individualization)  Description: Pt will sleep 6-8 hours per night.   Pt will eat 50% of meals.   Pt will attend 50% of groups when on open unit when able.  Pt will comply with nursing assessment as needed.   Pt will accept PRN medications when offered.    Outcome: Improving   Cooperative. Medication compliant. Denies mental health issues. Pt reports she feels ready to discharge home and tells writer that this is the plan for Monday. Loud at times. Pleasant during conversation. Attended groups. No reports of pain.     Problem: Thought Process Alteration  Goal: Optimal Thought Clarity  Description: Pt will have a linear reality based conversation by discharge.   Outcome: Improving         Face to face end of shift report to be communicated to oncoming RN.

## 2021-07-31 NOTE — PROGRESS NOTES
" [facility]  Psychiatric Progress Note      Impression:    This is a 45 year old yo female with a history of bipolar disorder and multiple hospitalizations.    Did sleep 6.5 hour last night. Was up once and was able to return to sleep. She is more organized today than when I last saw her. I have not seen her for quite some time as I had been off though she has had improvement since then. She does not appear to be preoccupied. She socializes appropriately with peers. She has been attending groups. She states she \"thinks but not certain that I will be able to go home on Monday.\" she states that our  spoke with her  last Thursday. Nursing notes do indicate there is still some mood labiltiy and paranoia though this appears to be better the past 2 days.          Diagnoses:   Bipolar 1 disorder, current episode manic    Attestation:  Patient has been seen and evaluated by me,  April Dary Chance NP          Interim History:   The patient's care was discussed with the treatment team and chart notes were reviewed.          Medications:     Current Facility-Administered Medications Ordered in Epic   Medication Dose Route Frequency Last Rate Last Admin     acetaminophen (TYLENOL) tablet 650 mg  650 mg Oral Q4H PRN   650 mg at 07/11/21 0959     alum & mag hydroxide-simethicone (MAALOX) suspension 30 mL  30 mL Oral Q4H PRN         benztropine (COGENTIN) tablet 1 mg  1 mg Oral BID PRN         cloNIDine (CATAPRES) tablet 0.1 mg  0.1 mg Oral BID PRN   0.1 mg at 07/11/21 1746     diphenhydrAMINE (BENADRYL) injection 25-50 mg  25-50 mg Intramuscular Q6H PRN        Or     diphenhydrAMINE (BENADRYL) capsule 25-50 mg  25-50 mg Oral Q6H PRN   50 mg at 07/12/21 0832     divalproex sodium extended-release (DEPAKOTE ER) 24 hr tablet 750 mg  750 mg Oral At Bedtime         fluPHENAZine (PROLIXIN) half-tab 0.5 mg  0.5 mg Oral BID         haloperidol lactate (HALDOL) injection 5-10 mg  5-10 mg " "Intramuscular Q6H PRN        Or     haloperidol (HALDOL) tablet 5-10 mg  5-10 mg Oral Q6H PRN   10 mg at 07/12/21 0832     hydrOXYzine (ATARAX) tablet 25-50 mg  25-50 mg Oral Q4H PRN         levothyroxine (SYNTHROID/LEVOTHROID) tablet 50 mcg  50 mcg Oral QAM AC   50 mcg at 07/12/21 0643     lithium (ESKALITH CR/LITHOBID) CR tablet 450 mg  450 mg Oral At Bedtime         LORazepam (ATIVAN) tablet 1-2 mg  1-2 mg Oral Q6H PRN   2 mg at 07/12/21 0832    Or     LORazepam (ATIVAN) injection 1-2 mg  1-2 mg Intramuscular Q6H PRN         melatonin tablet 3 mg  3 mg Oral At Bedtime PRN   3 mg at 07/11/21 2359     multivitamin w/minerals (THERA-VIT-M) tablet 1 tablet  1 tablet Oral Daily         nicotine (NICORETTE) gum 2 mg  2 mg Buccal Q1H PRN         OLANZapine (zyPREXA) tablet 5-10 mg  5-10 mg Oral TID PRN        Or     OLANZapine (zyPREXA) injection 5-10 mg  5-10 mg Intramuscular TID PRN   10 mg at 07/11/21 0918     senna-docusate (SENOKOT-S/PERICOLACE) 8.6-50 MG per tablet 1 tablet  1 tablet Oral BID PRN         Vitamin D3 (CHOLECALCIFEROL) tablet 50 mcg  50 mcg Oral Daily   50 mcg at 07/12/21 0832     No current [facility]-ordered outpatient medications on file.            10 point ROS- denies acute concerns       Allergies:     Allergies   Allergen Reactions     Shellfish-Derived Products Rash            Psychiatric Examination:   /90   Pulse 74   Temp 98.6  F (37  C) (Temporal)   Resp 14   Ht 1.626 m (5' 4\")   Wt 85.4 kg (188 lb 3.2 oz)   SpO2 100%   BMI 32.30 kg/m    Weight is 188 lbs 3.2 oz  Body mass index is 32.3 kg/m .    Appearance: awake, alert  Attitude: pleasant, cooperative  Eye Contact: appropriate  Mood: Seemingly good  Affect:  mood congruent  Speech:  clear, coherent  Psychomotor Behavior:  no evidence of tardive dyskinesia, dystonia, or tics  Thought Process: remained logical, linear and goal oriented  Associations: no loosening of associations noted  Thought Content:  Denies suicidal thoughts, " denies hallucinations  Insight:  limited per previous reports, this was my first meeting with her  Judgment:  limited  - as above  Oriented to: person, place, time  Attention Span and Concentration: adequate  Recent and Remote Memory:  fair  Fund of Knowledge: appropriate for education  Muscle Strength and Tone: normal  Gait and Station: Normal           Labs:     No results found for this or any previous visit (from the past 24 hour(s)).        BEHAVIORAL TEAM DISCUSSION    Progress: improving. Need to contact  to determine what services she has in place in order to have safe discharge.   Continued Stay Criteria/Rationale: Need to contact  to determine what services she has in place in order to have safe discharge.       Medical/Physical: denies acute concerns  Precautions:   Falls precaution?: No  Behavioral Orders   Procedures     Code 1 - Restrict to Unit     Routine Programming     As clinically indicated     Status 15     Every 15 minutes.       Plan:       No change in medications today.         Continue Lithium  mg at bedtime-changed to concentrate  Continue Depakote ER 1,500 mg at bedtime  Continue Prolixin 10 mg BID  Continue Cogentin 1 mg BID  Stay of commitment granted         Participants: Sherry Petty , APRN, CNP, Nursing, OT, SW

## 2021-07-31 NOTE — PLAN OF CARE
"  Problem: Behavioral Health Plan of Care  Goal: Patient-Specific Goal (Individualization)  Description: Pt will sleep 6-8 hours per night.   Pt will eat 50% of meals.   Pt will attend 50% of groups when on open unit when able.  Pt will comply with nursing assessment as needed.   Pt will accept PRN medications when offered.    Outcome: Improving  Note: Shift Summery:     Face to face end of shift report received from evening RN. Rounding completed. Pt in bed with eyes closed with rested and unlabored breathing. 0140 Pt up in lobby to have a snack. 0146 Pt reports a mild headache, and she requests and receives Tylenol prn. Pt returned to bed. Pt accepts and receives prescribed medications. 0700 Pt states, \"I have a rumbly tummy.\";  Maalox administered prn as ordered. Will continue to monitor. Face to face end of shift report to be given to day shift RN.     Pt slept approximately 6.5 hours.     Problem: Thought Process Alteration  Goal: Optimal Thought Clarity  Description: Pt will have a linear reality based conversation by discharge.   Outcome: Improving     "

## 2021-07-31 NOTE — PLAN OF CARE
SHIFT SUMMARY:  Denies pain, SI/HI, AH/VH, depression and anxiety.  Presenting as elated and loud - PRN zyprexa 10 mg PO given at 0820 - effective. Sitting quietly with another patient in the lounge after breakfast. Occasionally sitting with others in the lounge, less intrusive and boisterous. Report given to oncoming nurse.      Problem: Behavioral Health Plan of Care  Goal: Patient-Specific Goal (Individualization)  Description: Pt will sleep 6-8 hours per night.   Pt will eat 50% of meals.   Pt will attend 50% of groups when on open unit when able.  Pt will comply with nursing assessment as needed.   Pt will accept PRN medications when offered.    Outcome: Improving  Goal: Absence of New-Onset Illness or Injury  Outcome: Improving  Intervention: Identify and Manage Fall Risk  Recent Flowsheet Documentation  Taken 7/31/2021 0900 by Rashard Cameron RN  Safety Measures: self-directed behavior promoted     Problem: Thought Process Alteration  Goal: Optimal Thought Clarity  Description: Pt will have a linear reality based conversation by discharge.   Outcome: Improving

## 2021-08-01 PROCEDURE — 124N000001 HC R&B MH

## 2021-08-01 PROCEDURE — 250N000013 HC RX MED GY IP 250 OP 250 PS 637: Performed by: NURSE PRACTITIONER

## 2021-08-01 RX ADMIN — FLUPHENAZINE HYDROCHLORIDE 10 MG: 5 TABLET, FILM COATED ORAL at 20:20

## 2021-08-01 RX ADMIN — Medication 50 MCG: at 08:19

## 2021-08-01 RX ADMIN — FLUPHENAZINE HYDROCHLORIDE 10 MG: 5 TABLET, FILM COATED ORAL at 08:19

## 2021-08-01 RX ADMIN — BENZTROPINE MESYLATE 1 MG: 1 TABLET ORAL at 08:19

## 2021-08-01 RX ADMIN — DIVALPROEX SODIUM 1500 MG: 500 TABLET, FILM COATED, EXTENDED RELEASE ORAL at 20:20

## 2021-08-01 RX ADMIN — BENZTROPINE MESYLATE 1 MG: 1 TABLET ORAL at 20:20

## 2021-08-01 RX ADMIN — LEVOTHYROXINE SODIUM 50 MCG: 0.05 TABLET ORAL at 06:12

## 2021-08-01 RX ADMIN — LITHIUM CARBONATE 900 MG: 300 CAPSULE ORAL at 20:20

## 2021-08-01 RX ADMIN — MULTIPLE VITAMINS W/ MINERALS TAB 1 TABLET: TAB at 08:19

## 2021-08-01 RX ADMIN — ALUMINUM HYDROXIDE, MAGNESIUM HYDROXIDE, AND SIMETHICONE 30 ML: 200; 200; 20 SUSPENSION ORAL at 07:44

## 2021-08-01 NOTE — PLAN OF CARE
Face to face end of shift report received from Xena CRUZ RN. Rounding completed and patient observed in the lounge for breakfast. No requests at this time.     13:00 Update: Patient has endorsed hopefullness that she will get to go home tomorrow. Her affect is bright. She denied symptoms and denied pain. She took meds without complaint and asked for them before this writer had them ready. Meds were reviewed with her and she is unable to recall the dosing and names of the meds. Patient appears elated and talks a little loud but she is easily redirectable.     15:15 Update: Face to face end of shift report communicated to the charge nurse. This writer will continue to provide nursing services for patient throughout the next shift. Rounding completed and patient observed.    20:00 Update: Patient remained positive throughout PM shift. She interacted with peers using appropriate boundaries. She took meds without complaint. No request for PRNs.     Face to face end of shift report communicated to oncoming RN.       Problem: Behavioral Health Plan of Care  Goal: Patient-Specific Goal (Individualization)  Description: Pt will sleep 6-8 hours per night.   Pt will eat 50% of meals.   Pt will attend 50% of groups when on open unit when able.  Pt will comply with nursing assessment as needed.   Pt will accept PRN medications when offered.    Outcome: Improving     Problem: Thought Process Alteration  Goal: Optimal Thought Clarity  Description: Pt will have a linear reality based conversation by discharge.   Outcome: Improving

## 2021-08-01 NOTE — PLAN OF CARE
Problem: Behavioral Health Plan of Care  Goal: Patient-Specific Goal (Individualization)  Description: Pt will sleep 6-8 hours per night.   Pt will eat 50% of meals.   Pt will attend 50% of groups when on open unit when able.  Pt will comply with nursing assessment as needed.   Pt will accept PRN medications when offered.    Outcome: Improving  Note: Shift Summery:      Face to face end of shift report received from evening shift RN. Rounding completed. Pt observed sleeping with rested and unlabored breathing. Will continue to monitor. Pt accepted and received prescribed medications. Face to face end of shift report to be given to day shift RN.     Pt slept approximately 10.75 hours.      Problem: Thought Process Alteration  Goal: Optimal Thought Clarity  Description: Pt will have a linear reality based conversation by discharge.   Outcome: Improving

## 2021-08-02 LAB — SARS-COV-2 RNA RESP QL NAA+PROBE: NEGATIVE

## 2021-08-02 PROCEDURE — 124N000001 HC R&B MH

## 2021-08-02 PROCEDURE — U0003 INFECTIOUS AGENT DETECTION BY NUCLEIC ACID (DNA OR RNA); SEVERE ACUTE RESPIRATORY SYNDROME CORONAVIRUS 2 (SARS-COV-2) (CORONAVIRUS DISEASE [COVID-19]), AMPLIFIED PROBE TECHNIQUE, MAKING USE OF HIGH THROUGHPUT TECHNOLOGIES AS DESCRIBED BY CMS-2020-01-R: HCPCS | Performed by: NURSE PRACTITIONER

## 2021-08-02 PROCEDURE — 250N000013 HC RX MED GY IP 250 OP 250 PS 637: Performed by: NURSE PRACTITIONER

## 2021-08-02 PROCEDURE — 99239 HOSP IP/OBS DSCHRG MGMT >30: CPT | Performed by: NURSE PRACTITIONER

## 2021-08-02 RX ORDER — DIVALPROEX SODIUM 500 MG/1
1500 TABLET, EXTENDED RELEASE ORAL AT BEDTIME
Qty: 90 TABLET | Refills: 1 | Status: SHIPPED | OUTPATIENT
Start: 2021-08-03

## 2021-08-02 RX ORDER — FLUPHENAZINE HYDROCHLORIDE 10 MG/1
10 TABLET ORAL 2 TIMES DAILY
Qty: 60 TABLET | Refills: 1 | Status: SHIPPED | OUTPATIENT
Start: 2021-08-03

## 2021-08-02 RX ORDER — LANOLIN ALCOHOL/MO/W.PET/CERES
3 CREAM (GRAM) TOPICAL
Qty: 30 TABLET | Refills: 1 | Status: SHIPPED | OUTPATIENT
Start: 2021-08-02

## 2021-08-02 RX ORDER — OLANZAPINE 10 MG/1
10 TABLET, ORALLY DISINTEGRATING ORAL 2 TIMES DAILY PRN
Qty: 30 TABLET | Refills: 1 | Status: SHIPPED | OUTPATIENT
Start: 2021-08-02

## 2021-08-02 RX ORDER — BENZTROPINE MESYLATE 1 MG/1
TABLET ORAL
Qty: 120 TABLET | Refills: 0 | Status: SHIPPED | OUTPATIENT
Start: 2021-08-02

## 2021-08-02 RX ORDER — BENZTROPINE MESYLATE 1 MG/1
1 TABLET ORAL 2 TIMES DAILY PRN
Status: DISCONTINUED | OUTPATIENT
Start: 2021-08-02 | End: 2021-08-02

## 2021-08-02 RX ORDER — VITAMIN B COMPLEX
50 TABLET ORAL DAILY
Qty: 30 TABLET | Refills: 1 | Status: SHIPPED | OUTPATIENT
Start: 2021-08-03

## 2021-08-02 RX ORDER — LITHIUM CARBONATE 450 MG
900 TABLET, EXTENDED RELEASE ORAL AT BEDTIME
Qty: 60 TABLET | Refills: 1 | Status: SHIPPED | OUTPATIENT
Start: 2021-08-03

## 2021-08-02 RX ORDER — LITHIUM CARBONATE 450 MG
900 TABLET, EXTENDED RELEASE ORAL AT BEDTIME
Status: DISCONTINUED | OUTPATIENT
Start: 2021-08-02 | End: 2021-08-03 | Stop reason: HOSPADM

## 2021-08-02 RX ORDER — LEVOTHYROXINE SODIUM 50 UG/1
50 TABLET ORAL
Qty: 30 TABLET | Refills: 1 | Status: SHIPPED | OUTPATIENT
Start: 2021-08-03

## 2021-08-02 RX ADMIN — Medication 50 MCG: at 08:07

## 2021-08-02 RX ADMIN — BENZTROPINE MESYLATE 1 MG: 1 TABLET ORAL at 08:08

## 2021-08-02 RX ADMIN — MULTIPLE VITAMINS W/ MINERALS TAB 1 TABLET: TAB at 08:07

## 2021-08-02 RX ADMIN — FLUPHENAZINE HYDROCHLORIDE 10 MG: 5 TABLET, FILM COATED ORAL at 20:11

## 2021-08-02 RX ADMIN — ALUMINUM HYDROXIDE, MAGNESIUM HYDROXIDE, AND SIMETHICONE 30 ML: 200; 200; 20 SUSPENSION ORAL at 07:50

## 2021-08-02 RX ADMIN — DIVALPROEX SODIUM 1500 MG: 500 TABLET, FILM COATED, EXTENDED RELEASE ORAL at 20:10

## 2021-08-02 RX ADMIN — LEVOTHYROXINE SODIUM 50 MCG: 0.05 TABLET ORAL at 06:25

## 2021-08-02 RX ADMIN — BENZTROPINE MESYLATE 1 MG: 1 TABLET ORAL at 20:11

## 2021-08-02 RX ADMIN — LITHIUM CARBONATE 900 MG: 450 TABLET, EXTENDED RELEASE ORAL at 20:11

## 2021-08-02 RX ADMIN — FLUPHENAZINE HYDROCHLORIDE 10 MG: 5 TABLET, FILM COATED ORAL at 08:07

## 2021-08-02 ASSESSMENT — ACTIVITIES OF DAILY LIVING (ADL)
DRESS: SCRUBS (BEHAVIORAL HEALTH);INDEPENDENT
HYGIENE/GROOMING: INDEPENDENT
LAUNDRY: UNABLE TO COMPLETE
ORAL_HYGIENE: INDEPENDENT

## 2021-08-02 NOTE — PLAN OF CARE
Face to face end of shift report will be communicated to oncoming RN.     Problem: Behavioral Health Plan of Care  Goal: Patient-Specific Goal (Individualization)  Description: Pt will sleep 6-8 hours per night.   Pt will eat 50% of meals.   Pt will attend 50% of groups when on open unit when able.  Pt will comply with nursing assessment as needed.   Pt will accept PRN medications when offered.    Outcome: No Change     Problem: Thought Process Alteration  Goal: Optimal Thought Clarity  Description: Pt will have a linear reality based conversation by discharge.   Outcome: No Change   Face to face end of shift report obtained from ANTONIO Lu. Pt observed resting in bed.   Pt appears to be sleeping in bed with eyes closed, having regular respirations, and position changes. 15 minutes and PRN safety checks completed with no noted complains. No delusional comments noted or reported so far this shift.  0600- Pt appeared to had slept 6.5 hours.

## 2021-08-02 NOTE — PROGRESS NOTES
" [facility]  Psychiatric Progress Note      Impression:    This is a 45 year old yo female with a history of bipolar disorder and multiple hospitalizations.    Rohith appears to be doing quite well.  Her moods are no longer labile and she is no longer making paranoid statements.  Her affect is quite bright though not elated.  I did tell her that we were planning for discharge for tomorrow and her  would be picking her up.  She was quite happy with this.  She is going to be following up with a psychiatrist she has worked for for quite some time and she has a good rapport with him.  Today I did notice that her dystonia appears to be more significant.  I did bring this up to her and she told me \"it is just the tics I got from being on Risperdal.\"  I did tell her that the Prolixin she is on could be worsening this and that I could increase her Cogentin.  She stated it was not bothersome nor was it painful.  It does appear to be torticollis though she is able to move her neck.  It does not appear that it becomes fixed in posture for any type of period of time.  She does not want an increase in Cogentin though I did tell her I would order her some as needed Cogentin and that I would like her outpatient psychiatrist to follow-up on this and she possibly may need a decrease in her future Prolixin dose as she is on 10 mg twice a day.  She does have good insight into her mental illness and does feel like the medications are working very well.         Diagnoses:   Bipolar 1 disorder, current episode manic    Attestation:  Patient has been seen and evaluated by me,  Shy Chance NP          Interim History:   The patient's care was discussed with the treatment team and chart notes were reviewed.          Medications:     Current Facility-Administered Medications Ordered in Epic   Medication Dose Route Frequency Last Rate Last Admin     acetaminophen (TYLENOL) tablet 650 mg  650 mg Oral " Q4H PRN   650 mg at 07/11/21 0959     alum & mag hydroxide-simethicone (MAALOX) suspension 30 mL  30 mL Oral Q4H PRN         benztropine (COGENTIN) tablet 1 mg  1 mg Oral BID PRN         cloNIDine (CATAPRES) tablet 0.1 mg  0.1 mg Oral BID PRN   0.1 mg at 07/11/21 1746     diphenhydrAMINE (BENADRYL) injection 25-50 mg  25-50 mg Intramuscular Q6H PRN        Or     diphenhydrAMINE (BENADRYL) capsule 25-50 mg  25-50 mg Oral Q6H PRN   50 mg at 07/12/21 0832     divalproex sodium extended-release (DEPAKOTE ER) 24 hr tablet 750 mg  750 mg Oral At Bedtime         fluPHENAZine (PROLIXIN) half-tab 0.5 mg  0.5 mg Oral BID         haloperidol lactate (HALDOL) injection 5-10 mg  5-10 mg Intramuscular Q6H PRN        Or     haloperidol (HALDOL) tablet 5-10 mg  5-10 mg Oral Q6H PRN   10 mg at 07/12/21 0832     hydrOXYzine (ATARAX) tablet 25-50 mg  25-50 mg Oral Q4H PRN         levothyroxine (SYNTHROID/LEVOTHROID) tablet 50 mcg  50 mcg Oral QAM AC   50 mcg at 07/12/21 0643     lithium (ESKALITH CR/LITHOBID) CR tablet 450 mg  450 mg Oral At Bedtime         LORazepam (ATIVAN) tablet 1-2 mg  1-2 mg Oral Q6H PRN   2 mg at 07/12/21 0832    Or     LORazepam (ATIVAN) injection 1-2 mg  1-2 mg Intramuscular Q6H PRN         melatonin tablet 3 mg  3 mg Oral At Bedtime PRN   3 mg at 07/11/21 2359     multivitamin w/minerals (THERA-VIT-M) tablet 1 tablet  1 tablet Oral Daily         nicotine (NICORETTE) gum 2 mg  2 mg Buccal Q1H PRN         OLANZapine (zyPREXA) tablet 5-10 mg  5-10 mg Oral TID PRN        Or     OLANZapine (zyPREXA) injection 5-10 mg  5-10 mg Intramuscular TID PRN   10 mg at 07/11/21 0918     senna-docusate (SENOKOT-S/PERICOLACE) 8.6-50 MG per tablet 1 tablet  1 tablet Oral BID PRN         Vitamin D3 (CHOLECALCIFEROL) tablet 50 mcg  50 mcg Oral Daily   50 mcg at 07/12/21 0832     No current [facility]-ordered outpatient medications on file.            10 point ROS- denies acute concerns       Allergies:     Allergies   Allergen  "Reactions     Shellfish-Derived Products Rash            Psychiatric Examination:   /80   Pulse 88   Temp 97.2  F (36.2  C) (Temporal)   Resp 16   Ht 1.626 m (5' 4\")   Wt 85.1 kg (187 lb 9.6 oz)   SpO2 99%   BMI 32.20 kg/m    Weight is 187 lbs 9.6 oz  Body mass index is 32.2 kg/m .    Appearance: awake, alert  Attitude: pleasant, cooperative  Eye Contact: appropriate  Mood: Seemingly good  Affect:  mood congruent  Speech:  clear, coherent  Psychomotor Behavior:  no evidence of tardive dyskinesia, dystonia, or tics  Thought Process: remained logical, linear and goal oriented  Associations: no loosening of associations noted  Thought Content:  Denies suicidal thoughts, denies hallucinations  Insight:  limited per previous reports, this was my first meeting with her  Judgment:  limited  - as above  Oriented to: person, place, time  Attention Span and Concentration: adequate  Recent and Remote Memory:  fair  Fund of Knowledge: appropriate for education  Muscle Strength and Tone: normal  Gait and Station: Normal           Labs:     No results found for this or any previous visit (from the past 24 hour(s)).        BEHAVIORAL TEAM DISCUSSION    Progress: improving. Need to contact  to determine what services she has in place in order to have safe discharge. Going to change lithium liquid back to tablet today and discharge home tomorrow.   Continued Stay Criteria/Rationale: Need to contact  to determine what services she has in place in order to have safe discharge.       Medical/Physical: denies acute concerns  Precautions:   Falls precaution?: No  Behavioral Orders   Procedures     Code 1 - Restrict to Unit     Routine Programming     As clinically indicated     Status 15     Every 15 minutes.       Plan:       No change in medications today.       Added as needed dose of Cogentin with scheduled.  She did not want it increased  Continue Lithium  mg at bedtime-changed back to " pill  Continue Depakote ER 1,500 mg at bedtime  Continue Prolixin 10 mg BID  Continue Cogentin 1 mg BID  Stay of commitment granted         Participants: April PHILL allan, CNP, Nursing, OT, SW

## 2021-08-02 NOTE — PLAN OF CARE
MÓNICA spoke to patient. Patient wanted an update after Team of when she could discharge home.     MÓNICA called and spoke with patient's [facility]  José Antonio TERRAZAS. José Antonio BENITEZ Stated he will come and pick patient up for discharge @ 9:30 am 8/03/2021 and he will review the aftercare at the time with patient. José Antonio provided his cell number for patient to call him today @ 2:30 pm. MÓNICA provided the patient with her  José Antonio's phone number and explained he would like her to call him @ 2:30 pm.     MÓNICA reviewed the After care plan with the patient. She stated she understood what was outlined in the after care plan and signed the document. MÓNICA signed the After Care plane and emailed it to the patient's  José Antonio as he requested MÓNICA to do.     José Antonio BENITEZ Patient's  called again.  He stated he will call the [facility] unit when he arrives tomorrow 8/3/2021 @ 9:30 am and will meet with patient to review the after Care plan. After he reviews the after care plan with patient he will transport the patient home.

## 2021-08-02 NOTE — PLAN OF CARE
Face to face received from Drea HOLM RN.   Rounding completed, pt observed in lounge this morning.     Pt up at nurses' station this morning requesting maalox for indigestion this morning. Pt received 30 mL maalox at 07:50.    Pt cooperative with morning medications. Pt denies pain this morning. Pt reports that slept well last night.   Pt full range affect, almost elated behavior this shift.  Pt denies SI and/or HI. Pt does not appear to be responding to internal stimuli.  Pt in attending group session this morning.      Problem: Behavioral Health Plan of Care  Goal: Patient-Specific Goal (Individualization)  Description: Pt will sleep 6-8 hours per night.   Pt will eat 50% of meals.   Pt will attend 50% of groups when on open unit when able.  Pt will comply with nursing assessment as needed.   Pt will accept PRN medications when offered.    Outcome: Improving  Note:        Problem: Thought Process Alteration  Goal: Optimal Thought Clarity  Description: Pt will have a linear reality based conversation by discharge.   Outcome: Improving     Face to face end of shift report to be communicated to oncoming RN.     Luiza May RN  8/2/2021  9:44 AM

## 2021-08-02 NOTE — PROGRESS NOTES
"[facility]    Spiritual Health Progress Note    Date of Service (when I saw the patient): 08/02/2021     Assessment & Plan   Rohith Rosa is a 45 year old female who was admitted on 7/10/2021. Visit was a follow-up to check in with patient from previous visit. Patient seemed to be in a happy mood today.  Told the  she would discharged soon. She said that she had \"learned a lot\" while being on the floor and desires to take what she has learned to her next step. Closed in prayer together.    Rev. Napoleon Blanca  Volunteer   "

## 2021-08-02 NOTE — DISCHARGE INSTRUCTIONS
Behavioral Discharge Planning and Instructions    Summary:   Patient is a 45 year old  female who was brought to the [facility] ED by EMS after creating a disturbance at her apartment complex.    Main Diagnosis: Bipolar 1 disorder, current episode manic    Health Care Follow-up:     [facility] - Under a Stay of Commitment.   [facility]  [address] [place] [address]  Phone [phone]   Fax- [phone]    KEILY Rivers   - Adult Mental Health  [facility]  [address] [place] [id number]  [email or web address]  C: [phone]  O: [phone]    [facility]  [address]  [place] [address]  Appointment: 8/18/2021 @4:15 pm for after hospital follow up with Dr. Kenney   [phone]  Fax: [phone]      [employer]  Appointment: 9/10/2021 @ 8:20 am for Medication Management with Conrado Weiner  Appointment: 8/10/2021 @ 8:00 am for Individual Therapy with Cecilio Scruggs.   [facility]  [address]  [place] [address]  Phone: [phone]  Fax: [phone]    [facility]  / ARMHS Worker Eh Shepard- [phone]  [address] [place] [address]  Phone- [phone]   Fax- [phone]    [email or web address]  [address]   [place] [address] [phone]      Major Treatments, Procedures and Findings:  You were provided with: a psychiatric assessment, assessed for medical stability, medication evaluation and/or management, group therapy, family therapy, individual therapy, CD evaluation/assessment, milieu management and medical interventions    Symptoms to Report: feeling more aggressive, increased confusion, losing more sleep, mood getting worse or thoughts of suicide    Early warning  signs can include: increased depression or anxiety sleep disturbances increased thoughts or behaviors of suicide or self-harm  increased unusual thinking, such as paranoia or hearing voices      Resources:   Crisis Intervention: 787.157.6634 or 887-362-8319 (TTY: 409.948.2262).  Call anytime for help.  National [place] on Mental Illness (www.[place].jane.org): 610.791.9078 or 083-184-0936.  [place] Association for Children's Mental Health (www.macmh.org): 105.168.4385.  Alcoholics Anonymous (www.alcoholics-anonymous.org): Check your phone book for your local chapter.  Suicide Awareness Voices of Education (SAVE) (www.save.org): 623-289-IMIS (9619)  National Suicide Prevention Line (www.mentalhealthmn.org): 537-398-DUEK (9092)  Mental Health Consumer/Survivor Network of [place] (www.mhcsn.net): 261.996.9005 or 402-387-6794  Mental Health Association of [place] (www.mentalhealth.org): 765.104.4863 or 296-873-0481  Self- Management and Recovery Training., SMART-- Toll free: 783.842.9922  www.[employer]  Crisis Intervention: 562.334.4783 or 329-945-8602. Call anytime for help.   [place]:  [facility] stabilization [facility]- [phone]  Critical access hospital Crisis Line: 1-162.367.2868  Advocates For Family Peace: 772-2414  Sexual Assault Program [facility]: [phone] or 1-808.481.6378  [facility] Battered Women's Program: 7-577-305-9502 Ext: 279       Calls answered Mon-Fri-8:00 am--4:30 pm    [place]:  Advocates for Family Peace: 2-240-517-7456  [facility] - [phone]  [facility] first call for help: 3-004-724-0669  [facility] Crisis Center:  [phone]    [place] Area:  Warm Line: 1-700.604.9669       Calls answered Tuesday--Saturday 4:00 pm--10:00 pm  [place] Crisis Line - [phone]  [facility] [phone]    [place] Statewide:  [place] Crisis and Referral Services: 1-992.711.5046  National Suicide Prevention Lifeline: 2-826-843-TALK (9644)   - auf7iape- Text  "\"Life\" to [address]271  First Call for Help: 2-1-1  MARKOS Helpline- 9-079-XYNE-HELP   Crisis Text Line: Text  [place]  to 700100    General Medication Instructions:   See your medication sheet(s) for instructions.   Take all medicines as directed.  Make no changes unless your doctor suggests them.   Go to all your doctor visits.  Be sure to have all your required lab tests. This way, your medicines can be refilled on time.  Do not use any drugs not prescribed by your doctor.  Avoid alcohol.    Advance Directives:   Scanned document on file with [employer]? Yes, scanned ACP docmt  Is document scanned? Pt unable to confirm  Honoring Choices Your Rights Handout: Informed and given  Was more information offered? Pt declined    The Treatment team has appreciated the opportunity to work with you. If you have any questions or concerns about your recent admission, you can contact the unit which can receive your call 24 hours a day, 7 days a week. They will be able to get in touch with a Provider if needed. The unit number is [id number].    [place] Area:  [facility], Crisis stabilization [facility]- [phone]  [facility] Crisis Line: 1-959.874.4668  Advocates For Family Peace: 539-9192  Sexual Assault Program [facility]: [phone] or 1-946.125.2494  [facility] Battered Women's Program: 5-486-465-9164 Ext: 279       Calls answered Mon-Fri-8:00 am--4:30 pm    [place]:  Advocates for Family Peace: 6-774-073-3524  [facility] - [phone]  [facility] first call for help: [phone]  [facility] Counsel[facility] Crisis Center:  ([phone]    [place] Area:  Warm Line: 1-628.339.2780       Calls answered Tuesday--Saturday 4:00 pm--10:00 pm  [place] Crisis Line - [phone]  [facility] 876-500-4491    [place]wide:  [place] Crisis and Referral Services: 1-470.930.3515  National Suicide Prevention Lifeline: 1-404-093-TALK (1012)   - gnd9yvsy- Text \"Life\" to 48054  First Call for Help: " 2-1-1  MARK[facility] Helpline- 2-454-CVIL-HELP   Crisis Text Line: Text  [place]  to 989090

## 2021-08-03 VITALS
TEMPERATURE: 98.2 F | DIASTOLIC BLOOD PRESSURE: 85 MMHG | SYSTOLIC BLOOD PRESSURE: 138 MMHG | WEIGHT: 187.6 LBS | HEART RATE: 85 BPM | HEIGHT: 64 IN | OXYGEN SATURATION: 98 % | RESPIRATION RATE: 14 BRPM | BODY MASS INDEX: 32.03 KG/M2

## 2021-08-03 PROCEDURE — 250N000013 HC RX MED GY IP 250 OP 250 PS 637: Performed by: NURSE PRACTITIONER

## 2021-08-03 RX ADMIN — FLUPHENAZINE HYDROCHLORIDE 10 MG: 5 TABLET, FILM COATED ORAL at 08:19

## 2021-08-03 RX ADMIN — MULTIPLE VITAMINS W/ MINERALS TAB 1 TABLET: TAB at 08:20

## 2021-08-03 RX ADMIN — LEVOTHYROXINE SODIUM 50 MCG: 0.05 TABLET ORAL at 06:43

## 2021-08-03 RX ADMIN — BENZTROPINE MESYLATE 1 MG: 1 TABLET ORAL at 08:19

## 2021-08-03 RX ADMIN — Medication 50 MCG: at 08:20

## 2021-08-03 ASSESSMENT — ACTIVITIES OF DAILY LIVING (ADL)
LAUNDRY: UNABLE TO COMPLETE
DRESS: SCRUBS (BEHAVIORAL HEALTH);INDEPENDENT
HYGIENE/GROOMING: INDEPENDENT
ORAL_HYGIENE: INDEPENDENT

## 2021-08-03 NOTE — DISCHARGE SUMMARY
"     Psychiatric Discharge Summary    Rohith Rosa MRN# [id number]   Age: 45 year old YOB: 1975     Date of Admission:  7/10/2021  Date of Discharge:  8/3/2021  Admitting Physician:  Cece Singleton MD  Discharge Provider:  Shy Chance NP          Event Leading to Hospitalization and Hospital Stay   Rohith Rosa is a 45 year old  female who was brought to the [facility] ED by EMS after creating a disturbance at her apartment complex. It was her second ED visit that day, had previously been seen for chest pain and anxiety. In the ED she was labile, had flight of ideas, pressured speech, and had reportedly been noncompliant with medications. She had apparently been yelling and swearing at people who lived in the same building though on a different floor, was found outside another resident's apartment. She denied any substance use and tested negative in ED. She was placed on a 72 hour hold and transferred to behavioral health for further evaluation.      Rohith is familiar to me from several years ago when she was hospitalized at [facility]. She does recognize me from these previous hospitalizations. When she arrived on the unit she was noted to have multiple scarves, hair ties, and yamilet pins in her hair. She refused to remove these for staff. She was then dipping her hair in the toilet, removing the wet scrunchies, and trying to give them to other patients. A code 21 was called and she was placed in a manual hold while staff removed these accessories. While this was being done she was intermittently praying and cursing at staff.      When I see her today she is in her room in the MHICU making lists in a journal. She asks for help in writing down \"what men want and what women want. Like lingerie and jewelry and boats\". She states that \"God says to plant these items in their respective rooms\", indicating the other patients on the unit. She believes that " "her  is somewhere on this unit. When asked why she was brought to the ED she replies \"I had a nervous breakdown and can't take the pressure\". She reports she has been taking medications, though it is unclear how compliant she is. Lithium level in ED was 0.4 and Depakote level was 52. Labs were negative for all substances. When asked if she still takes Fluphenazine she states \"well that sounds familiar, I guess I don't know\", and then changes the subject and states talking about something unrelated. Her pharmacy is closed, will need to get an updated list tomorrow. Will restart [place] and Depakote tonight and add PRN medications for agitation/paranoia.                   Stay:        While she was here lithium Depakote restarted.  Prolixin was also restarted.  She did not want to restart her Prolixin injection.  In the past she had been on Prolixin injection along with a very low dose of oral Prolixin she was willing to take oral Prolixin though she required a higher dose than she was on before as she had no longer wanted to be taking the long-acting Prolixin injection.  Prior to starting the Prolixin she did have abnormal neck movements which she states \"is from the Risperdal I was on years ago.  It does not bother me at all.\"  She states that the dystonia in her neck is not painful and is not bothersome to her.  It appeared to increase slightly when Prolixin dose was increased.  She is going to be following up with the same psychiatrist she has been working with for the past year and she states she has a good rapport with him.  She was compliant with her medications throughout her hospitalization though commitment for petition was filled out as she initially was asking to be discharged home and no longer wanted to stay in the hospital as she had initially felt she was quite stable.  Once she stabilized on medications she stated she was sleeping very well and she felt her symptoms were well controlled.  She " does actually have quite good insight into her mental illness once she stabilizes.  She is going to be discharged home to her apartment.  She is going to follow-up with her old  hazel, who is likely going to be her arms worker.  She also had a good relationship and rapport with him.  [place].  Admitted to unit  [address] [place]. Provided a safe environment and therapeutic milieu. Encouraged participation in unit activities.       Treatment Team Progress Note:   The patient's care was discussed with the treatment team and chart notes were reviewed.    Plan:  Patient is discharging at the recommendation of the treatment team.     Our team has made contact with her  to ensure readiness for discharge.     At time of discharge, there is no evidence that patient is in immediate danger of self or others.        Diagnoses:     Schizoaffective disorder, bipolar type, most recent episode manic         Labs:     Results for orders placed or performed during the hospital encounter of 07/10/21   Comprehensive metabolic panel     Status: Abnormal   Result Value Ref Range    Sodium 140 133 - 144 mmol/L    Potassium 4.4 3.4 - 5.3 mmol/L    Chloride 113 (H) 94 - 109 mmol/L    Carbon Dioxide (CO2) 23 20 - 32 mmol/L    Anion Gap 4 3 - 14 mmol/L    Urea Nitrogen 9 7 - 30 mg/dL    Creatinine 0.78 0.52 - 1.04 mg/dL    Calcium 9.1 8.5 - 10.1 mg/dL    Glucose 76 70 - 99 mg/dL    Alkaline Phosphatase 55 40 - 150 U/L    AST 7 0 - 45 U/L    ALT 25 0 - 50 U/L    Protein Total 6.8 6.8 - 8.8 g/dL    Albumin 3.5 3.4 - 5.0 g/dL    Bilirubin Total 0.2 0.2 - 1.3 mg/dL    GFR Estimate >90 >60 mL/min/1.73m2   Ammonia     Status: Normal   Result Value Ref Range    Ammonia 37 10 - 50 umol/L   CBC with platelets and differential     Status: None   Result Value Ref Range    WBC Count 5.3 4.0 - 11.0 10e3/uL    RBC Count 4.04 3.80 - 5.20 10e6/uL    Hemoglobin 11.8 11.7 -  15.7 g/dL    Hematocrit 35.8 35.0 - 47.0 %    MCV 89 78 - 100 fL    MCH 29.2 26.5 - 33.0 pg    MCHC 33.0 31.5 - 36.5 g/dL    RDW 13.0 10.0 - 15.0 %    Platelet Count 161 150 - 450 10e3/uL    % Neutrophils 46 %    % Lymphocytes 40 %    % Monocytes 11 %    % Eosinophils 3 %    % Basophils 0 %    % Immature Granulocytes 0 %    NRBCs per 100 WBC 0 <1 /100    Absolute Neutrophils 2.4 1.6 - 8.3 10e3/uL    Absolute Lymphocytes 2.1 0.8 - 5.3 10e3/uL    Absolute Monocytes 0.6 0.0 - 1.3 10e3/uL    Absolute Eosinophils 0.1 0.0 - 0.7 10e3/uL    Absolute Basophils 0.0 0.0 - 0.2 10e3/uL    Absolute Immature Granulocytes 0.0 <=0.0 10e3/uL    Absolute NRBCs 0.0 10e3/uL   SARS-COV2 (COVID-19) Virus RT-PCR     Status: Normal    Specimen: Nasopharyngeal; Swab   Result Value Ref Range    SARS CoV2 PCR Negative Negative    Narrative    Testing was performed using the Xpert Xpress SARS-CoV-2 Assay on the   Cepheid Gene-Xpert Instrument Systems. Additional information about   this Emergency Use Authorization (EUA) assay can be found via the Lab   Guide. This test should be ordered for the detection of SARS-CoV-2 in   individuals who meet SARS-CoV-2 clinical and/or epidemiological   criteria. Test performance is unknown in asymptomatic patients. This   test is for in vitro diagnostic use under the FDA EUA for   laboratories certified under CLIA to perform high complexity testing.   This test has not been FDA cleared or approved. A negative result   does not rule out the presence of PCR inhibitors in the specimen or   target RNA in concentration below the limit of detection for the   assay. The possibility of a false negative should be considered if   the patient's recent exposure or clinical presentation suggests   COVID-19. This test was validated by [facility]. This laboratory is certified under the Clinical Laboratory Improve  ment Amendments (CLIA) as qualified to perform high complexity testing.    Valproic acid     Status: Normal   Result Value Ref Range    Valproic acid 74   mg/L   Lithium level     Status: Normal   Result Value Ref Range    Lithium 0.5   mmol/L   SARS-COV2 (COVID-19) Virus RT-PCR     Status: Normal    Specimen: Nasopharyngeal; Swab   Result Value Ref Range    SARS CoV2 PCR Negative Negative    Narrative    Testing was performed using the Xpert Xpress SARS-CoV-2 Assay on the   [employer] Gene-Xpert Instrument Systems. Additional information about   this Emergency Use Authorization (EUA) assay can be found via the Lab   Guide. This test should be ordered for the detection of SARS-CoV-2 in   individuals who meet SARS-CoV-2 clinical and/or epidemiological   criteria. Test performance is unknown in asymptomatic patients. This   test is for in vitro diagnostic use under the FDA EUA for   laboratories certified under CLIA to perform high complexity testing.   This test has not been FDA cleared or approved. A negative result   does not rule out the presence of PCR inhibitors in the specimen or   target RNA in concentration below the limit of detection for the   assay. The possibility of a false negative should be considered if   the patient's recent exposure or clinical presentation suggests   COVID-19. This test was validated by [facility]. This laboratory is certified under the Clinical Laboratory Improve  ment Amendments (CLIA) as qualified to perform high complexity testing.   Lithium level     Status: Normal   Result Value Ref Range    Lithium 0.4   mmol/L   SARS-COV2 (COVID-19) Virus RT-PCR     Status: Normal    Specimen: Nasopharyngeal; Swab   Result Value Ref Range    SARS CoV2 PCR Negative Negative    Narrative    Testing was performed using the Xpert Xpress SARS-CoV-2 Assay on the   [employer] Instrument Systems. Additional information about   this Emergency Use Authorization (EUA) assay can be found via the Lab   Guide. This test should be ordered  for the detection of SARS-CoV-2 in   individuals who meet SARS-CoV-2 clinical and/or epidemiological   criteria. Test performance is unknown in asymptomatic patients. This   test is for in vitro diagnostic use under the FDA EUA for   laboratories certified under CLIA to perform high complexity testing.   This test has not been FDA cleared or approved. A negative result   does not rule out the presence of PCR inhibitors in the specimen or   target RNA in concentration below the limit of detection for the   assay. The possibility of a false negative should be considered if   the patient's recent exposure or clinical presentation suggests   COVID-19. This test was validated by [facility]. This laboratory is certified under the Clinical Laboratory Improve  ment Amendments (CLIA) as qualified to perform high complexity testing.   Asymptomatic COVID-19 Virus (Coronavirus) by PCR Nasopharyngeal     Status: Normal    Specimen: Nasopharyngeal; Swab    Narrative    The following orders were created for panel order Asymptomatic COVID-19 Virus (Coronavirus) by PCR Nasopharyngeal.  Procedure                               Abnormality         Status                     ---------                               -----------         ------                     SARS-COV2 (COVID-19) Vir...[[id number]]  Normal              Final result                 Please view results for these tests on the individual orders.   Asymptomatic COVID-19 Virus (Coronavirus) by PCR Nasopharyngeal     Status: Normal    Specimen: Nasopharyngeal; Swab    Narrative    The following orders were created for panel order Asymptomatic COVID-19 Virus (Coronavirus) by PCR Nasopharyngeal.  Procedure                               Abnormality         Status                     ---------                               -----------         ------                     SARS-COV2 (COVID-19) Vir...[[id number]]  Normal              Final result                  Please view results for these tests on the individual orders.   Asymptomatic COVID-19 Virus (Coronavirus) by PCR Nasopharyngeal     Status: Normal    Specimen: Nasopharyngeal; Swab    Narrative    The following orders were created for panel order Asymptomatic COVID-19 Virus (Coronavirus) by PCR Nasopharyngeal.  Procedure                               Abnormality         Status                     ---------                               -----------         ------                     SARS-COV2 (COVID-19) Vir...[[id number]]  Normal              Final result                 Please view results for these tests on the individual orders.   CBC with Platelets & Differential     Status: None    Narrative    The following orders were created for panel order CBC with Platelets & Differential.  Procedure                               Abnormality         Status                     ---------                               -----------         ------                     CBC with platelets and d...[[id number]]                      Final result                 Please view results for these tests on the individual orders.                    Discharge Medications:     Current Discharge Medication List      START taking these medications    Details   melatonin 3 MG tablet Take 1 tablet (3 mg) by mouth nightly as needed for sleep  Qty: 30 tablet, Refills: 1    Associated Diagnoses: Paranoid schizophrenia (H)      nicotine (NICORETTE) 2 MG gum Place 1 each (2 mg) inside cheek every hour as needed for other (nicotine withdrawal symptoms)  Qty: 220 each, Refills: 1    Associated Diagnoses: Paranoid schizophrenia (H)      OLANZapine zydis (ZYPREXA) 10 MG ODT Take 1 tablet (10 mg) by mouth 2 times daily as needed (anxiety)  Qty: 30 tablet, Refills: 1    Associated Diagnoses: Paranoid schizophrenia (H)      Vitamin D3 (CHOLECALCIFEROL) 25 mcg (1000 units) tablet Take 2 tablets (50 mcg) by mouth daily  Qty: 30 tablet, Refills: 1     "Associated Diagnoses: Paranoid schizophrenia (H)         CONTINUE these medications which have CHANGED    Details   benztropine (COGENTIN) 1 MG tablet Take one tablet twice a day and if needed may take another tablet up to twice a day as needed for abnormal movements/pain.  Qty: 120 tablet, Refills: 0    Associated Diagnoses: Paranoid schizophrenia (H)      divalproex sodium extended-release (DEPAKOTE ER) 500 MG 24 hr tablet Take 3 tablets (1,500 mg) by mouth At Bedtime  Qty: 90 tablet, Refills: 1    Associated Diagnoses: Paranoid schizophrenia (H)      fluPHENAZine (PROLIXIN) 10 MG tablet Take 1 tablet (10 mg) by mouth 2 times daily  Qty: 60 tablet, Refills: 1    Associated Diagnoses: Paranoid schizophrenia (H)      levothyroxine (SYNTHROID/LEVOTHROID) 50 MCG tablet Take 1 tablet (50 mcg) by mouth every morning (before breakfast)  Qty: 30 tablet, Refills: 1    Associated Diagnoses: Paranoid schizophrenia (H)      lithium (ESKALITH CR/LITHOBID) 450 MG CR tablet Take 2 tablets (900 mg) by mouth At Bedtime  Qty: 60 tablet, Refills: 1    Associated Diagnoses: Paranoid schizophrenia (H)         CONTINUE these medications which have NOT CHANGED    Details   Multiple Vitamins-Minerals (CENTROVITE) TABS Take 1 tablet by mouth daily         STOP taking these medications       Vitamin D (Cholecalciferol) 50 MCG (2000 UT) CAPS Comments:   Reason for Stopping:                    Psychiatric Examination:       MSE/PSYCH  PSYCHIATRIC EXAM  /85   Pulse 85   Temp 98.2  F (36.8  C) (Temporal)   Resp 14   Ht 1.626 m (5' 4\")   Wt 85.1 kg (187 lb 9.6 oz)   SpO2 98%   BMI 32.20 kg/m    -Appearance/Behavior: normal and improved  -Motor: intact  -Gait: intact  -Abnormal involuntary movements: none  -Mood: reactive and calm  -Affect: brighter  -Speech: regular      -Thought process/associations: Logical and Goal directed.  -Thought content: no delusions or hallucinations  -Perceptual disturbances: No hallucinations..          "     -Suicidal/Homicidal Ideation: denies any   -Judgment: Good.  -Insight: Good.  *Orientation: time, place and person.  *Memory: intact  *Attention: intact  *Language: fluent, no aphasias, able to repeat phrases and name objects. Vocab intact.  *Fund of information: appropriate for education  *Cognitive functioning estimate: 0 - independent.           Discharge Plan:       She is discharging home with her  today.  She is under a stay of commitment.              Attestation:  The patient has been seen and evaluated by me,  Shy Chance NP         Discharge Services Provided:    45   minutes spent on discharge services, including:  Final examination of patient.  Review and discussion of Hospital stay.  Instructions for continued outpatient care/goals.  Preparation of discharge records.  Preparation of medications refills and new prescriptions.  Preparation of Applicable referral forms.

## 2021-08-03 NOTE — PLAN OF CARE
Face to face end of shift report will be communicated to oncoming RN.       Problem: Behavioral Health Plan of Care  Goal: Patient-Specific Goal (Individualization)  Description: Pt will sleep 6-8 hours per night.   Pt will eat 50% of meals.   Pt will attend 50% of groups when on open unit when able.  Pt will comply with nursing assessment as needed.   Pt will accept PRN medications when offered.    Outcome: No Change     Problem: Thought Process Alteration  Goal: Optimal Thought Clarity  Description: Pt will have a linear reality based conversation by discharge.   Outcome: No Change   Face to face end of shift report obtained from ANTONIO Carpenter. Pt observed resting in bed.   0200-Pt appears to be sleeping in bed with eyes closed, having regular respirations, and position changes. 15 minutes and PRN safety checks completed with no noted complains. No delusional comments noted or reported so far this shift.  0330- Pt awake talking to peers in lobby.  0600- Pt appeared to had slept 3.5 hours. Pt had no complains.

## 2021-08-03 NOTE — PLAN OF CARE
Discharge Note    Patient Discharged to home on 8/3/2021 09:44  AM via pt's  via private car accompanied by staff.     Patient informed of discharge instructions in AVS. patient verbalizes understanding and denies having any questions pertaining to AVS. Patient stable at time of discharge. Patient denies SI, HI, and thoughts of self harm at time of discharge. All personal belongings returned to patient. Discharge prescriptions sent to [place], [place]  via electronic communication. AVS  sent with patient upon discharge.    Luiza May RN  8/3/2021  11:03 AM

## 2021-08-03 NOTE — PLAN OF CARE
Face to face end of shift report communicated to oncoming shift.     Pauline Elizabeth RN  8/2/2021  11:11 PM       Problem: Behavioral Health Plan of Care  Goal: Patient-Specific Goal (Individualization)  Description: Pt will sleep 6-8 hours per night.   Pt will eat 50% of meals.   Pt will attend 50% of groups when on open unit when able.  Pt will comply with nursing assessment as needed.   Pt will accept PRN medications when offered.    8/2/2021 2228 by Pauline Elizabeth, RN  Outcome: Improving  Note: Shift Summery:    Patient up in the lounge socializing and interacting appropriately with staff and peers.  Patient is polite in conversation.  Patient expresses excitement about discharging tomorrow.    Patient denies SI, HI, AH and VH.    Patient eats greater than 50% of meals, patient takes medications as prescribed.     Face to face start of shift report received from RN.  Patient in lounge at this time, will continue to monitor.

## 2021-08-03 NOTE — PLAN OF CARE
Pt is discharging at the recommendation of the treatment team. Pt is discharging to home transported by . Pt denies having any thoughts of hurting themself or anyone else.  Pt has follow up with [facility], [facility], [facility], and [facility]. Discharge instructions, including; demographic sheet, psychiatric evaluation, discharge summary, and AVS were faxed to these next level of care providers.

## 2021-08-03 NOTE — PLAN OF CARE
Face to face received from Drea HOLM RN.   Rounding completed, pt observed in lounge this morning for breakfast.     Pt calm cooperative with morning medications. Pt denies pain this morning.   Pt denies SI and/or HI this morning. Pt does not appear to be responding to internal stimuli. Adult suicide risk completed this morning. Pt denies thoughts of self harm, and denies access to guns or any other objects may harm self with.     AVS reviewed with pt this shift.   Pt concerned about if will have medications at home to be able to take this evening. This writer called [facility] this shift to verify medications will be delivered to pt's home this afternoon. Provider notified needs to contact [facility] to verify medications.    Problem: Behavioral Health Plan of Care  Goal: Patient-Specific Goal (Individualization)  Description: Pt will sleep 6-8 hours per night.   Pt will eat 50% of meals.   Pt will attend 50% of groups when on open unit when able.  Pt will comply with nursing assessment as needed.   Pt will accept PRN medications when offered.    Outcome: Improving  Note:        Problem: Thought Process Alteration  Goal: Optimal Thought Clarity  Description: Pt will have a linear reality based conversation by discharge.   Outcome: Improving      Face to face end of shift report  to be communicated to oncoming RN.     Luiza May RN  8/3/2021  10:49 AM